# Patient Record
Sex: MALE | URBAN - METROPOLITAN AREA
[De-identification: names, ages, dates, MRNs, and addresses within clinical notes are randomized per-mention and may not be internally consistent; named-entity substitution may affect disease eponyms.]

---

## 2018-04-01 ENCOUNTER — INPATIENT (INPATIENT)
Facility: HOSPITAL | Age: 53
LOS: 3 days | Discharge: HOME HEALTH | End: 2018-04-05
Attending: INTERNAL MEDICINE | Admitting: INTERNAL MEDICINE
Payer: MEDICARE

## 2018-04-01 VITALS
HEART RATE: 49 BPM | DIASTOLIC BLOOD PRESSURE: 52 MMHG | SYSTOLIC BLOOD PRESSURE: 90 MMHG | TEMPERATURE: 98 F | OXYGEN SATURATION: 98 % | RESPIRATION RATE: 18 BRPM

## 2018-04-01 DIAGNOSIS — R55 SYNCOPE AND COLLAPSE: ICD-10-CM

## 2018-04-01 DIAGNOSIS — Z95.0 PRESENCE OF CARDIAC PACEMAKER: Chronic | ICD-10-CM

## 2018-04-01 DIAGNOSIS — Z98.89 OTHER SPECIFIED POSTPROCEDURAL STATES: Chronic | ICD-10-CM

## 2018-04-01 DIAGNOSIS — Z96.60 PRESENCE OF UNSPECIFIED ORTHOPEDIC JOINT IMPLANT: Chronic | ICD-10-CM

## 2018-04-01 DIAGNOSIS — I51.0 CARDIAC SEPTAL DEFECT, ACQUIRED: Chronic | ICD-10-CM

## 2018-04-01 LAB
ALBUMIN SERPL ELPH-MCNC: 3.8 G/DL — SIGNIFICANT CHANGE UP (ref 3.3–5)
ALP SERPL-CCNC: 96 U/L — SIGNIFICANT CHANGE UP (ref 40–120)
ALT FLD-CCNC: 14 U/L — SIGNIFICANT CHANGE UP (ref 4–41)
APTT BLD: 35.2 SEC — SIGNIFICANT CHANGE UP (ref 27.5–37.4)
AST SERPL-CCNC: 31 U/L — SIGNIFICANT CHANGE UP (ref 4–40)
BASOPHILS # BLD AUTO: 0.02 K/UL — SIGNIFICANT CHANGE UP (ref 0–0.2)
BASOPHILS NFR BLD AUTO: 0.3 % — SIGNIFICANT CHANGE UP (ref 0–2)
BILIRUB SERPL-MCNC: 2.6 MG/DL — HIGH (ref 0.2–1.2)
BUN SERPL-MCNC: 55 MG/DL — HIGH (ref 7–23)
CALCIUM SERPL-MCNC: 9.6 MG/DL — SIGNIFICANT CHANGE UP (ref 8.4–10.5)
CHLORIDE SERPL-SCNC: 88 MMOL/L — LOW (ref 98–107)
CK MB BLD-MCNC: 2.71 NG/ML — SIGNIFICANT CHANGE UP (ref 1–6.6)
CK SERPL-CCNC: 294 U/L — HIGH (ref 30–200)
CO2 SERPL-SCNC: 32 MMOL/L — HIGH (ref 22–31)
CREAT SERPL-MCNC: 1.41 MG/DL — HIGH (ref 0.5–1.3)
EOSINOPHIL # BLD AUTO: 0.01 K/UL — SIGNIFICANT CHANGE UP (ref 0–0.5)
EOSINOPHIL NFR BLD AUTO: 0.1 % — SIGNIFICANT CHANGE UP (ref 0–6)
GLUCOSE SERPL-MCNC: 127 MG/DL — HIGH (ref 70–99)
HCT VFR BLD CALC: 38.8 % — LOW (ref 39–50)
HGB BLD-MCNC: 12.3 G/DL — LOW (ref 13–17)
IMM GRANULOCYTES # BLD AUTO: 0.02 # — SIGNIFICANT CHANGE UP
IMM GRANULOCYTES NFR BLD AUTO: 0.3 % — SIGNIFICANT CHANGE UP (ref 0–1.5)
INR BLD: 1.85 — HIGH (ref 0.88–1.17)
LYMPHOCYTES # BLD AUTO: 0.87 K/UL — LOW (ref 1–3.3)
LYMPHOCYTES # BLD AUTO: 11.8 % — LOW (ref 13–44)
MCHC RBC-ENTMCNC: 29.3 PG — SIGNIFICANT CHANGE UP (ref 27–34)
MCHC RBC-ENTMCNC: 31.7 % — LOW (ref 32–36)
MCV RBC AUTO: 92.4 FL — SIGNIFICANT CHANGE UP (ref 80–100)
MONOCYTES # BLD AUTO: 0.82 K/UL — SIGNIFICANT CHANGE UP (ref 0–0.9)
MONOCYTES NFR BLD AUTO: 11.1 % — SIGNIFICANT CHANGE UP (ref 2–14)
NEUTROPHILS # BLD AUTO: 5.65 K/UL — SIGNIFICANT CHANGE UP (ref 1.8–7.4)
NEUTROPHILS NFR BLD AUTO: 76.4 % — SIGNIFICANT CHANGE UP (ref 43–77)
NRBC # FLD: 0 — SIGNIFICANT CHANGE UP
NT-PROBNP SERPL-SCNC: 4123 PG/ML — SIGNIFICANT CHANGE UP
PLATELET # BLD AUTO: 155 K/UL — SIGNIFICANT CHANGE UP (ref 150–400)
PMV BLD: 11.5 FL — SIGNIFICANT CHANGE UP (ref 7–13)
POTASSIUM SERPL-MCNC: 3 MMOL/L — LOW (ref 3.5–5.3)
POTASSIUM SERPL-SCNC: 3 MMOL/L — LOW (ref 3.5–5.3)
PROT SERPL-MCNC: 8.4 G/DL — HIGH (ref 6–8.3)
PROTHROM AB SERPL-ACNC: 21.6 SEC — HIGH (ref 9.8–13.1)
RBC # BLD: 4.2 M/UL — SIGNIFICANT CHANGE UP (ref 4.2–5.8)
RBC # FLD: 16 % — HIGH (ref 10.3–14.5)
SODIUM SERPL-SCNC: 135 MMOL/L — SIGNIFICANT CHANGE UP (ref 135–145)
TROPONIN T SERPL-MCNC: < 0.06 NG/ML — SIGNIFICANT CHANGE UP (ref 0–0.06)
WBC # BLD: 7.39 K/UL — SIGNIFICANT CHANGE UP (ref 3.8–10.5)
WBC # FLD AUTO: 7.39 K/UL — SIGNIFICANT CHANGE UP (ref 3.8–10.5)

## 2018-04-01 PROCEDURE — 71046 X-RAY EXAM CHEST 2 VIEWS: CPT | Mod: 26

## 2018-04-01 RX ORDER — POTASSIUM CHLORIDE 20 MEQ
20 PACKET (EA) ORAL ONCE
Qty: 0 | Refills: 0 | Status: COMPLETED | OUTPATIENT
Start: 2018-04-01 | End: 2018-04-01

## 2018-04-01 RX ORDER — OXYCODONE AND ACETAMINOPHEN 5; 325 MG/1; MG/1
1 TABLET ORAL ONCE
Qty: 0 | Refills: 0 | Status: DISCONTINUED | OUTPATIENT
Start: 2018-04-01 | End: 2018-04-01

## 2018-04-01 RX ORDER — ASPIRIN/CALCIUM CARB/MAGNESIUM 324 MG
162 TABLET ORAL ONCE
Qty: 0 | Refills: 0 | Status: COMPLETED | OUTPATIENT
Start: 2018-04-01 | End: 2018-04-01

## 2018-04-01 RX ORDER — POTASSIUM CHLORIDE 20 MEQ
40 PACKET (EA) ORAL ONCE
Qty: 0 | Refills: 0 | Status: COMPLETED | OUTPATIENT
Start: 2018-04-01 | End: 2018-04-01

## 2018-04-01 RX ADMIN — Medication 162 MILLIGRAM(S): at 19:25

## 2018-04-01 RX ADMIN — Medication 40 MILLIEQUIVALENT(S): at 21:12

## 2018-04-01 RX ADMIN — OXYCODONE AND ACETAMINOPHEN 1 TABLET(S): 5; 325 TABLET ORAL at 20:36

## 2018-04-01 RX ADMIN — Medication 20 MILLIEQUIVALENT(S): at 20:36

## 2018-04-01 RX ADMIN — OXYCODONE AND ACETAMINOPHEN 1 TABLET(S): 5; 325 TABLET ORAL at 19:28

## 2018-04-01 RX ADMIN — OXYCODONE AND ACETAMINOPHEN 1 TABLET(S): 5; 325 TABLET ORAL at 19:25

## 2018-04-01 NOTE — ED ADULT TRIAGE NOTE - CHIEF COMPLAINT QUOTE
p/t with hx multiple MI and  cardiac stents, c/o of weakness and dizziness since this am p/t c/o of pain  to rt le as well

## 2018-04-01 NOTE — ED PROVIDER NOTE - ATTENDING CONTRIBUTION TO CARE
53M p/w general weakness, dizziness, and leg pain x 2 days.  Constant dizziness, a/w feeling cold.  General weakness.  dizziness like he's going to pass out.  Also having some chest discomfort, intermittent.  Also with SOB, worse with exertion.  Never had it before.  Feels like he might pass out.  Poor appetite, a/w nausea.  No trauma.  c/o leg pain and back pain.  On AC - Xarelto.  Plan - multiple cardiac type sx - feeling unwell, EKG with paced rhythm and wide complex, will check labs incl CE, proBNP rx ASA and admit.   pmhx DM, HOCM, CHF, CVA  cards - [facility]  PSHX - heart surgery - septal ablation, AICD ([place])  no T  All NTG (swelling).  VS:  bradycardia, BP soft    GEN - malaise, fatigue; A+O x3.  very thin.  HEAD - NC/AT     ENT - PEERL, EOMI, mucous membranes dry , no discharge      NECK: Neck supple, non-tender without lymphadenopathy, no masses, no JVD  PULM - CTA b/l,  symmetric breath sounds.  Bilat posterior mild ribs ttp without deformity.  L ACW AICD  COR -  +murmur MG heart sounds    ABD - , ND, NT, soft, no guarding, no rebound, no masses    BACK - no CVA tenderness, nontender spine     EXTREMS - (+)1 edema, no deformity, warm and well perfused .  Bilat venous stasis changes.     SKIN - no rash or bruising      NEUROLOGIC - alert, CN 2-12 intact, sensation nl, motor 5/5 RUE/LUE/RLE/LLE.

## 2018-04-01 NOTE — ED PROVIDER NOTE - MEDICAL DECISION MAKING DETAILS
53M with 53M with presyncopal dizziness/weakness x 2 days with exacerbation of chronic RLE pain. Plan for labs, ekg, cxr, tele admission, pain control.

## 2018-04-01 NOTE — ED ADULT NURSE REASSESSMENT NOTE - NS ED NURSE REASSESS COMMENT FT1
Pt c/o pain in Right leg, with swelling x 2 days, pt walks with a cane; also c/o shortness of breath; pt has AICD defibrillator in place; paced rhythm on monitor; denies difficulty breathing at this time. Labs drawn and sent, awaiting MD orders.

## 2018-04-01 NOTE — ED PROVIDER NOTE - OBJECTIVE STATEMENT
53M p/w general weakness, dizziness, and leg pain x 2 days.  Constant dizziness, a/w feeling cold.  General weakness.  dizziness like he's going to pass out.  Also having some chest discomfort, intermittent.  Also with SOB, worse with exertion.  Never had it before.  Feels like he might pass out.  Poor appetite, a/w nausea.  No trauma.  c/o leg pain and back pain.  On AC - Xarelto Plan - multiple cardiac type sx - feeling unwell, EKG with paced rhythm and wide complex, will check labs incl CE, proBNP rx ASA and admit.   pmhx DM, HOCM, CHF, CVA  cards - [facility]  PSHX - heart surgery - septal ablation, AICD ([place])  no T  All NTG (swelling). 53M p/w general weakness, dizziness, and leg pain x 2 days.  Constant dizziness, a/w feeling cold.  General weakness.  dizziness like he's going to pass out.  Also having some chest discomfort, intermittent.  Also with SOB, worse with exertion.  Never had it before.  Feels like he might pass out.  Poor appetite, a/w nausea.  No trauma.  c/o leg pain and back pain.  On AC - Xarelto Plan - multiple cardiac type sx - feeling unwell, EKG with paced rhythm and wide complex, will check labs incl CE, proBNP rx ASA and admit.   pmhx DM, HOCM, CHF, CVA  cards - [facility]  PSHX - heart surgery - septal ablation, AICD ([place])  no T  All NTG (swelling).  VS:  bradycardia, BP soft    GEN - malaise, fatigue; A+O x3   HEAD - NC/AT     ENT - PEERL, EOMI, mucous membranes dry , no discharge      NECK: Neck supple, non-tender without lymphadenopathy, no masses, no JVD  PULM - CTA b/l,  symmetric breath sounds.  Bilat posterior mild ribs ttp without deformity  COR -  normal heart sounds    ABD - , ND, NT, soft, no guarding, no rebound, no masses    BACK - no CVA tenderness, nontender spine     EXTREMS - (+)1 edema, no deformity, warm and well perfused .  Bilat venous stasis changes.     SKIN - no rash or bruising      NEUROLOGIC - alert, CN 2-12 intact, sensation nl, motor 5/5 RUE/LUE/RLE/LLE. 53M p/w general weakness, dizziness, and leg pain x 2 days.  Constant dizziness, a/w feeling cold.  General weakness.  dizziness like he's going to pass out.  Also having some chest discomfort, intermittent.  Also with SOB, worse with exertion.  Never had it before.  Feels like he might pass out.  Poor appetite, a/w nausea.  No trauma.  c/o leg pain and back pain.  On AC - Xarelto.  Plan - multiple cardiac type sx - feeling unwell, EKG with paced rhythm and wide complex, will check labs incl CE, proBNP rx ASA and admit.   pmhx DM, HOCM, CHF, CVA  cards - [facility]  PSHX - heart surgery - septal ablation, AICD ([place])  no T  All NTG (swelling).  VS:  bradycardia, BP soft    GEN - malaise, fatigue; A+O x3.  very thin.  HEAD - NC/AT     ENT - PEERL, EOMI, mucous membranes dry , no discharge      NECK: Neck supple, non-tender without lymphadenopathy, no masses, no JVD  PULM - CTA b/l,  symmetric breath sounds.  Bilat posterior mild ribs ttp without deformity.  L ACW AICD  COR -  +murmur MG heart sounds    ABD - , ND, NT, soft, no guarding, no rebound, no masses    BACK - no CVA tenderness, nontender spine     EXTREMS - (+)1 edema, no deformity, warm and well perfused .  Bilat venous stasis changes.     SKIN - no rash or bruising      NEUROLOGIC - alert, CN 2-12 intact, sensation nl, motor 5/5 RUE/LUE/RLE/LLE.

## 2018-04-01 NOTE — ED PROVIDER NOTE - PHYSICAL EXAMINATION
msk: no midline spinal tenderness, +TTP over posterolateral R sided ribs, pelvis stable without pain, no TTP over lumbar spine, no TTP over extremities

## 2018-04-01 NOTE — ED PROVIDER NOTE - PROGRESS NOTE DETAILS
D/w Dr. Choi as admitted to his service in 2015 and pt unsure of his cardiologist's name. Requests admission under Dr. Negron. D/w Dr. Negron, accepted for admission under tele. Tele PA textpage sent.

## 2018-04-01 NOTE — ED PROVIDER NOTE - PSH
Acquired cardiac septal defect    Cardiac pacemaker  1994, [employer]  H/O bilateral hip replacements  2013  History of open heart surgery  1994- CABG  S/P hip replacement

## 2018-04-01 NOTE — ED PROVIDER NOTE - PMH
Arthritis    Atrial fibrillation    Cardiomyopathy    Chronic systolic heart failure    DM (diabetes mellitus)    DM (diabetes mellitus)    HTN (hypertension)    HTN (hypertension)    Pacemaker  with defibrillator

## 2018-04-02 DIAGNOSIS — R55 SYNCOPE AND COLLAPSE: ICD-10-CM

## 2018-04-02 DIAGNOSIS — I10 ESSENTIAL (PRIMARY) HYPERTENSION: ICD-10-CM

## 2018-04-02 DIAGNOSIS — J45.909 UNSPECIFIED ASTHMA, UNCOMPLICATED: ICD-10-CM

## 2018-04-02 DIAGNOSIS — I48.2 CHRONIC ATRIAL FIBRILLATION: ICD-10-CM

## 2018-04-02 DIAGNOSIS — M79.604 PAIN IN RIGHT LEG: ICD-10-CM

## 2018-04-02 DIAGNOSIS — E11.9 TYPE 2 DIABETES MELLITUS WITHOUT COMPLICATIONS: ICD-10-CM

## 2018-04-02 DIAGNOSIS — E87.6 HYPOKALEMIA: ICD-10-CM

## 2018-04-02 DIAGNOSIS — I42.1 OBSTRUCTIVE HYPERTROPHIC CARDIOMYOPATHY: ICD-10-CM

## 2018-04-02 DIAGNOSIS — E78.5 HYPERLIPIDEMIA, UNSPECIFIED: ICD-10-CM

## 2018-04-02 DIAGNOSIS — N17.9 ACUTE KIDNEY FAILURE, UNSPECIFIED: ICD-10-CM

## 2018-04-02 LAB
ALBUMIN SERPL ELPH-MCNC: 3.6 G/DL — SIGNIFICANT CHANGE UP (ref 3.3–5)
ALP SERPL-CCNC: 91 U/L — SIGNIFICANT CHANGE UP (ref 40–120)
ALT FLD-CCNC: 12 U/L — SIGNIFICANT CHANGE UP (ref 4–41)
AST SERPL-CCNC: 24 U/L — SIGNIFICANT CHANGE UP (ref 4–40)
B PERT DNA SPEC QL NAA+PROBE: SIGNIFICANT CHANGE UP
BILIRUB SERPL-MCNC: 2 MG/DL — HIGH (ref 0.2–1.2)
BUN SERPL-MCNC: 54 MG/DL — HIGH (ref 7–23)
BUN SERPL-MCNC: 55 MG/DL — HIGH (ref 7–23)
C PNEUM DNA SPEC QL NAA+PROBE: NOT DETECTED — SIGNIFICANT CHANGE UP
CALCIUM SERPL-MCNC: 9 MG/DL — SIGNIFICANT CHANGE UP (ref 8.4–10.5)
CALCIUM SERPL-MCNC: 9.2 MG/DL — SIGNIFICANT CHANGE UP (ref 8.4–10.5)
CHLORIDE SERPL-SCNC: 91 MMOL/L — LOW (ref 98–107)
CHLORIDE SERPL-SCNC: 91 MMOL/L — LOW (ref 98–107)
CHOLEST SERPL-MCNC: 93 MG/DL — LOW (ref 120–199)
CK MB BLD-MCNC: 2.46 NG/ML — SIGNIFICANT CHANGE UP (ref 1–6.6)
CK MB BLD-MCNC: 2.57 NG/ML — SIGNIFICANT CHANGE UP (ref 1–6.6)
CK SERPL-CCNC: 211 U/L — HIGH (ref 30–200)
CK SERPL-CCNC: 248 U/L — HIGH (ref 30–200)
CO2 SERPL-SCNC: 32 MMOL/L — HIGH (ref 22–31)
CO2 SERPL-SCNC: 36 MMOL/L — HIGH (ref 22–31)
CREAT SERPL-MCNC: 1.49 MG/DL — HIGH (ref 0.5–1.3)
CREAT SERPL-MCNC: 1.5 MG/DL — HIGH (ref 0.5–1.3)
DIGOXIN SERPL-MCNC: 1.3 NG/ML — SIGNIFICANT CHANGE UP (ref 0.8–2)
DIGOXIN SERPL-MCNC: 1.5 NG/ML — SIGNIFICANT CHANGE UP (ref 0.8–2)
FLUAV H1 2009 PAND RNA SPEC QL NAA+PROBE: NOT DETECTED — SIGNIFICANT CHANGE UP
FLUAV H1 RNA SPEC QL NAA+PROBE: NOT DETECTED — SIGNIFICANT CHANGE UP
FLUAV H3 RNA SPEC QL NAA+PROBE: NOT DETECTED — SIGNIFICANT CHANGE UP
FLUAV SUBTYP SPEC NAA+PROBE: SIGNIFICANT CHANGE UP
FLUBV RNA SPEC QL NAA+PROBE: NOT DETECTED — SIGNIFICANT CHANGE UP
GLUCOSE BLDC GLUCOMTR-MCNC: 212 MG/DL — HIGH (ref 70–99)
GLUCOSE SERPL-MCNC: 109 MG/DL — HIGH (ref 70–99)
GLUCOSE SERPL-MCNC: 110 MG/DL — HIGH (ref 70–99)
HADV DNA SPEC QL NAA+PROBE: NOT DETECTED — SIGNIFICANT CHANGE UP
HBA1C BLD-MCNC: 6.8 % — HIGH (ref 4–5.6)
HCOV 229E RNA SPEC QL NAA+PROBE: NOT DETECTED — SIGNIFICANT CHANGE UP
HCOV HKU1 RNA SPEC QL NAA+PROBE: NOT DETECTED — SIGNIFICANT CHANGE UP
HCOV NL63 RNA SPEC QL NAA+PROBE: NOT DETECTED — SIGNIFICANT CHANGE UP
HCOV OC43 RNA SPEC QL NAA+PROBE: NOT DETECTED — SIGNIFICANT CHANGE UP
HCT VFR BLD CALC: 37.6 % — LOW (ref 39–50)
HDLC SERPL-MCNC: 38 MG/DL — SIGNIFICANT CHANGE UP (ref 35–55)
HGB BLD-MCNC: 11.6 G/DL — LOW (ref 13–17)
HMPV RNA SPEC QL NAA+PROBE: NOT DETECTED — SIGNIFICANT CHANGE UP
HPIV1 RNA SPEC QL NAA+PROBE: NOT DETECTED — SIGNIFICANT CHANGE UP
HPIV2 RNA SPEC QL NAA+PROBE: NOT DETECTED — SIGNIFICANT CHANGE UP
HPIV3 RNA SPEC QL NAA+PROBE: NOT DETECTED — SIGNIFICANT CHANGE UP
HPIV4 RNA SPEC QL NAA+PROBE: NOT DETECTED — SIGNIFICANT CHANGE UP
LIPID PNL WITH DIRECT LDL SERPL: 53 MG/DL — SIGNIFICANT CHANGE UP
M PNEUMO DNA SPEC QL NAA+PROBE: NOT DETECTED — SIGNIFICANT CHANGE UP
MAGNESIUM SERPL-MCNC: 2 MG/DL — SIGNIFICANT CHANGE UP (ref 1.6–2.6)
MAGNESIUM SERPL-MCNC: 2.1 MG/DL — SIGNIFICANT CHANGE UP (ref 1.6–2.6)
MCHC RBC-ENTMCNC: 29.2 PG — SIGNIFICANT CHANGE UP (ref 27–34)
MCHC RBC-ENTMCNC: 30.9 % — LOW (ref 32–36)
MCV RBC AUTO: 94.7 FL — SIGNIFICANT CHANGE UP (ref 80–100)
NRBC # FLD: 0 — SIGNIFICANT CHANGE UP
PLATELET # BLD AUTO: 138 K/UL — LOW (ref 150–400)
PMV BLD: 11.2 FL — SIGNIFICANT CHANGE UP (ref 7–13)
POTASSIUM SERPL-MCNC: 3 MMOL/L — LOW (ref 3.5–5.3)
POTASSIUM SERPL-MCNC: 3.5 MMOL/L — SIGNIFICANT CHANGE UP (ref 3.5–5.3)
POTASSIUM SERPL-SCNC: 3 MMOL/L — LOW (ref 3.5–5.3)
POTASSIUM SERPL-SCNC: 3.5 MMOL/L — SIGNIFICANT CHANGE UP (ref 3.5–5.3)
PROT SERPL-MCNC: 7.6 G/DL — SIGNIFICANT CHANGE UP (ref 6–8.3)
RBC # BLD: 3.97 M/UL — LOW (ref 4.2–5.8)
RBC # FLD: 16.2 % — HIGH (ref 10.3–14.5)
RSV RNA SPEC QL NAA+PROBE: NOT DETECTED — SIGNIFICANT CHANGE UP
RV+EV RNA SPEC QL NAA+PROBE: NOT DETECTED — SIGNIFICANT CHANGE UP
SODIUM SERPL-SCNC: 137 MMOL/L — SIGNIFICANT CHANGE UP (ref 135–145)
SODIUM SERPL-SCNC: 138 MMOL/L — SIGNIFICANT CHANGE UP (ref 135–145)
TRIGL SERPL-MCNC: 71 MG/DL — SIGNIFICANT CHANGE UP (ref 10–149)
TROPONIN T SERPL-MCNC: < 0.06 NG/ML — SIGNIFICANT CHANGE UP (ref 0–0.06)
TROPONIN T SERPL-MCNC: < 0.06 NG/ML — SIGNIFICANT CHANGE UP (ref 0–0.06)
WBC # BLD: 6.72 K/UL — SIGNIFICANT CHANGE UP (ref 3.8–10.5)
WBC # FLD AUTO: 6.72 K/UL — SIGNIFICANT CHANGE UP (ref 3.8–10.5)

## 2018-04-02 PROCEDURE — 93282 PRGRMG EVAL IMPLANTABLE DFB: CPT | Mod: 26

## 2018-04-02 PROCEDURE — 76775 US EXAM ABDO BACK WALL LIM: CPT | Mod: 26

## 2018-04-02 RX ORDER — COLCHICINE 0.6 MG
0.6 TABLET ORAL DAILY
Qty: 0 | Refills: 0 | Status: DISCONTINUED | OUTPATIENT
Start: 2018-04-02 | End: 2018-04-05

## 2018-04-02 RX ORDER — MORPHINE SULFATE 50 MG/1
1 CAPSULE, EXTENDED RELEASE ORAL ONCE
Qty: 0 | Refills: 0 | Status: DISCONTINUED | OUTPATIENT
Start: 2018-04-02 | End: 2018-04-02

## 2018-04-02 RX ORDER — FUROSEMIDE 40 MG
40 TABLET ORAL
Qty: 0 | Refills: 0 | Status: DISCONTINUED | OUTPATIENT
Start: 2018-04-02 | End: 2018-04-02

## 2018-04-02 RX ORDER — DEXTROSE 50 % IN WATER 50 %
25 SYRINGE (ML) INTRAVENOUS ONCE
Qty: 0 | Refills: 0 | Status: DISCONTINUED | OUTPATIENT
Start: 2018-04-02 | End: 2018-04-05

## 2018-04-02 RX ORDER — SODIUM CHLORIDE 9 MG/ML
1000 INJECTION, SOLUTION INTRAVENOUS
Qty: 0 | Refills: 0 | Status: DISCONTINUED | OUTPATIENT
Start: 2018-04-02 | End: 2018-04-05

## 2018-04-02 RX ORDER — MONTELUKAST 4 MG/1
1 TABLET, CHEWABLE ORAL
Qty: 0 | Refills: 0 | COMMUNITY

## 2018-04-02 RX ORDER — OXYCODONE AND ACETAMINOPHEN 5; 325 MG/1; MG/1
2 TABLET ORAL EVERY 6 HOURS
Qty: 0 | Refills: 0 | Status: DISCONTINUED | OUTPATIENT
Start: 2018-04-02 | End: 2018-04-05

## 2018-04-02 RX ORDER — ALBUTEROL 90 UG/1
2 AEROSOL, METERED ORAL EVERY 6 HOURS
Qty: 0 | Refills: 0 | Status: DISCONTINUED | OUTPATIENT
Start: 2018-04-02 | End: 2018-04-05

## 2018-04-02 RX ORDER — POTASSIUM CHLORIDE 20 MEQ
40 PACKET (EA) ORAL EVERY 4 HOURS
Qty: 0 | Refills: 0 | Status: COMPLETED | OUTPATIENT
Start: 2018-04-02 | End: 2018-04-02

## 2018-04-02 RX ORDER — ALBUTEROL 90 UG/1
2 AEROSOL, METERED ORAL
Qty: 0 | Refills: 0 | COMMUNITY

## 2018-04-02 RX ORDER — DIGOXIN 250 MCG
0.12 TABLET ORAL DAILY
Qty: 0 | Refills: 0 | Status: DISCONTINUED | OUTPATIENT
Start: 2018-04-02 | End: 2018-04-02

## 2018-04-02 RX ORDER — DEXTROSE 50 % IN WATER 50 %
12.5 SYRINGE (ML) INTRAVENOUS ONCE
Qty: 0 | Refills: 0 | Status: DISCONTINUED | OUTPATIENT
Start: 2018-04-02 | End: 2018-04-05

## 2018-04-02 RX ORDER — MONTELUKAST 4 MG/1
10 TABLET, CHEWABLE ORAL DAILY
Qty: 0 | Refills: 0 | Status: DISCONTINUED | OUTPATIENT
Start: 2018-04-02 | End: 2018-04-05

## 2018-04-02 RX ORDER — FLUTICASONE PROPIONATE AND SALMETEROL 50; 250 UG/1; UG/1
1 POWDER ORAL; RESPIRATORY (INHALATION)
Qty: 0 | Refills: 0 | COMMUNITY

## 2018-04-02 RX ORDER — SODIUM CHLORIDE 9 MG/ML
250 INJECTION INTRAMUSCULAR; INTRAVENOUS; SUBCUTANEOUS ONCE
Qty: 0 | Refills: 0 | Status: COMPLETED | OUTPATIENT
Start: 2018-04-02 | End: 2018-04-02

## 2018-04-02 RX ORDER — INSULIN LISPRO 100/ML
VIAL (ML) SUBCUTANEOUS
Qty: 0 | Refills: 0 | Status: DISCONTINUED | OUTPATIENT
Start: 2018-04-02 | End: 2018-04-05

## 2018-04-02 RX ORDER — GLUCAGON INJECTION, SOLUTION 0.5 MG/.1ML
1 INJECTION, SOLUTION SUBCUTANEOUS ONCE
Qty: 0 | Refills: 0 | Status: DISCONTINUED | OUTPATIENT
Start: 2018-04-02 | End: 2018-04-05

## 2018-04-02 RX ORDER — BUDESONIDE AND FORMOTEROL FUMARATE DIHYDRATE 160; 4.5 UG/1; UG/1
2 AEROSOL RESPIRATORY (INHALATION)
Qty: 0 | Refills: 0 | Status: DISCONTINUED | OUTPATIENT
Start: 2018-04-02 | End: 2018-04-05

## 2018-04-02 RX ORDER — APIXABAN 2.5 MG/1
5 TABLET, FILM COATED ORAL EVERY 12 HOURS
Qty: 0 | Refills: 0 | Status: DISCONTINUED | OUTPATIENT
Start: 2018-04-02 | End: 2018-04-05

## 2018-04-02 RX ORDER — ATORVASTATIN CALCIUM 80 MG/1
10 TABLET, FILM COATED ORAL AT BEDTIME
Qty: 0 | Refills: 0 | Status: DISCONTINUED | OUTPATIENT
Start: 2018-04-02 | End: 2018-04-05

## 2018-04-02 RX ORDER — METOPROLOL TARTRATE 50 MG
1 TABLET ORAL
Qty: 0 | Refills: 0 | COMMUNITY

## 2018-04-02 RX ORDER — METOPROLOL TARTRATE 50 MG
50 TABLET ORAL EVERY 12 HOURS
Qty: 0 | Refills: 0 | Status: DISCONTINUED | OUTPATIENT
Start: 2018-04-02 | End: 2018-04-05

## 2018-04-02 RX ORDER — INSULIN LISPRO 100/ML
VIAL (ML) SUBCUTANEOUS AT BEDTIME
Qty: 0 | Refills: 0 | Status: DISCONTINUED | OUTPATIENT
Start: 2018-04-02 | End: 2018-04-05

## 2018-04-02 RX ORDER — IPRATROPIUM/ALBUTEROL SULFATE 18-103MCG
3 AEROSOL WITH ADAPTER (GRAM) INHALATION ONCE
Qty: 0 | Refills: 0 | Status: COMPLETED | OUTPATIENT
Start: 2018-04-02 | End: 2018-04-02

## 2018-04-02 RX ORDER — DEXTROSE 50 % IN WATER 50 %
1 SYRINGE (ML) INTRAVENOUS ONCE
Qty: 0 | Refills: 0 | Status: DISCONTINUED | OUTPATIENT
Start: 2018-04-02 | End: 2018-04-05

## 2018-04-02 RX ORDER — COLCHICINE 0.6 MG
1 TABLET ORAL
Qty: 0 | Refills: 0 | COMMUNITY

## 2018-04-02 RX ADMIN — BUDESONIDE AND FORMOTEROL FUMARATE DIHYDRATE 2 PUFF(S): 160; 4.5 AEROSOL RESPIRATORY (INHALATION) at 09:18

## 2018-04-02 RX ADMIN — OXYCODONE AND ACETAMINOPHEN 2 TABLET(S): 5; 325 TABLET ORAL at 18:30

## 2018-04-02 RX ADMIN — Medication 40 MILLIEQUIVALENT(S): at 04:16

## 2018-04-02 RX ADMIN — Medication 0.6 MILLIGRAM(S): at 14:43

## 2018-04-02 RX ADMIN — MORPHINE SULFATE 1 MILLIGRAM(S): 50 CAPSULE, EXTENDED RELEASE ORAL at 00:52

## 2018-04-02 RX ADMIN — MORPHINE SULFATE 1 MILLIGRAM(S): 50 CAPSULE, EXTENDED RELEASE ORAL at 01:55

## 2018-04-02 RX ADMIN — APIXABAN 5 MILLIGRAM(S): 2.5 TABLET, FILM COATED ORAL at 17:35

## 2018-04-02 RX ADMIN — Medication 40 MILLIEQUIVALENT(S): at 10:07

## 2018-04-02 RX ADMIN — OXYCODONE AND ACETAMINOPHEN 2 TABLET(S): 5; 325 TABLET ORAL at 23:41

## 2018-04-02 RX ADMIN — OXYCODONE AND ACETAMINOPHEN 2 TABLET(S): 5; 325 TABLET ORAL at 11:00

## 2018-04-02 RX ADMIN — SODIUM CHLORIDE 500 MILLILITER(S): 9 INJECTION INTRAMUSCULAR; INTRAVENOUS; SUBCUTANEOUS at 06:12

## 2018-04-02 RX ADMIN — BUDESONIDE AND FORMOTEROL FUMARATE DIHYDRATE 2 PUFF(S): 160; 4.5 AEROSOL RESPIRATORY (INHALATION) at 22:34

## 2018-04-02 RX ADMIN — Medication 40 MILLIEQUIVALENT(S): at 14:43

## 2018-04-02 RX ADMIN — MONTELUKAST 10 MILLIGRAM(S): 4 TABLET, CHEWABLE ORAL at 14:43

## 2018-04-02 RX ADMIN — OXYCODONE AND ACETAMINOPHEN 2 TABLET(S): 5; 325 TABLET ORAL at 17:35

## 2018-04-02 RX ADMIN — SODIUM CHLORIDE 500 MILLILITER(S): 9 INJECTION INTRAMUSCULAR; INTRAVENOUS; SUBCUTANEOUS at 03:48

## 2018-04-02 RX ADMIN — ATORVASTATIN CALCIUM 10 MILLIGRAM(S): 80 TABLET, FILM COATED ORAL at 22:33

## 2018-04-02 RX ADMIN — OXYCODONE AND ACETAMINOPHEN 1 TABLET(S): 5; 325 TABLET ORAL at 00:43

## 2018-04-02 RX ADMIN — OXYCODONE AND ACETAMINOPHEN 2 TABLET(S): 5; 325 TABLET ORAL at 10:07

## 2018-04-02 RX ADMIN — APIXABAN 5 MILLIGRAM(S): 2.5 TABLET, FILM COATED ORAL at 07:10

## 2018-04-02 RX ADMIN — Medication 3 MILLILITER(S): at 00:52

## 2018-04-02 NOTE — H&P ADULT - PMH
Arthritis    Asthma    Atrial fibrillation    Cardiomyopathy    Chronic pain of both lower extremities    Chronic systolic heart failure    DM (diabetes mellitus)    Gout    HTN (hypertension)    Hyperlipidemia    Pacemaker  with defibrillator

## 2018-04-02 NOTE — CHART NOTE - NSCHARTNOTEFT_GEN_A_CORE
ELECTROPHYSIOLOGY    Device Interrogation Performed                                  Date/Time:     04/02/2018    @ 11:10am  :         [employer]                               Model:      Dynagen single chamber ICD                           Mode:              VVIR                                         Rate:  50/130     Atrial Lead:  P wave amplitude:                          mv            Impedance                                       Ohms  Threshold                                          V @                ms      Ventricular Lead: RV  R wave amplitude      5.0                     mv  Impedance               403                        Ohms  Threshold                 0.7                        V @       0.5          ms        Battery Status:                     Good                Underlying Rhythm:             Atrial fibrillation with slow ventricular response    Events/Observation:            Brief episodes of NSVT on 2/20 and 2.22. No therapies.  No events corresponding to his admission symptoms of dizziness and chest pain which                                          occur every day.                                           Review of histograms indicate good chronotropic response (LRL at 50 b/min with heart rate typically  b/min).  Ventricular pacing 14%.    Impression/Plan:  Normal ICD function.   Normal sensing and pacing via iterative testing. Good battery status. Excellent threshold capture.  No reprogramming.

## 2018-04-02 NOTE — H&P ADULT - NSHPPHYSICALEXAM_GEN_ALL_CORE
--Vital Signs Last 24 Hrs  T(C): 36.1 (02 Apr 2018 00:52), Max: 36.8 (01 Apr 2018 20:34)  T(F): 97 (02 Apr 2018 00:52), Max: 98.2 (01 Apr 2018 20:34)  HR: 50 (02 Apr 2018 00:52) (49 - 50)  BP: 102/60 (02 Apr 2018 00:52) (90/52 - 111/56)  RR: 18 (02 Apr 2018 00:52) (18 - 18)  SpO2: 100% (02 Apr 2018 00:52) (98% - 100%)    PHYSICAL EXAM:  GENERAL: NAD, well-groomed, well-developed  HEENT: NC/AT. Poor dentition. EOMI and PERRL, bilat.   NECK: Supple, No JVD.   NERVOUS SYSTEM:  Alert & Oriented X3, Good concentration; Motor Strength 5/5 B/L upper and lower extremities; DTRs 2+ intact and symmetric  CHEST/LUNG: Fair air entry. +Wheezing. No rhonchi or rales.   HEART: Regular rate and rhythm; Grade 4 murmur. No gallops or rubs.   ABDOMEN: Soft, Nontender, Nondistended; Bowel sounds present  EXTREMITIES:  2+ Peripheral Pulses, Mild bilat ankle edema. Chronic hyperpigmentation of bilat LEs.

## 2018-04-02 NOTE — CONSULT NOTE ADULT - SUBJECTIVE AND OBJECTIVE BOX
CHIEF COMPLAINT:Patient is a 53y old  Male who presents with a chief complaint of Near syncope (02 Apr 2018 00:40)      HISTORY OF PRESENT ILLNESS:  This is a pleasant gentleman with history as below presented with fatigue , weakness and near syncope   He has history of HOCM, CHF (EF 53%) s/p AICD, HTN, HLD, DM-II (diet controlled), hx CVA, CAD s/p CABG 1994, Asthma, Gout, chronic LE pain, Afib on Eliquis with c/o generalized weakness, intermittent episodes of chest tightness (non-radiating), poor appetite, and dizziness on standing with near syncope x 2 days with associated nausea. Pt reports baseline orthopnea (uses 6 pillows at night).     PAST MEDICAL & SURGICAL HISTORY:  Hyperlipidemia  Gout  Asthma  Chronic pain of both lower extremities  Atrial fibrillation  Arthritis  Chronic systolic heart failure  Pacemaker: with defibrillator  DM (diabetes mellitus)  HTN (hypertension)  Cardiomyopathy  Cardiac pacemaker: 1994, [employer]  H/O bilateral hip replacements: 2013  History of open heart surgery: 1994- CABG  S/P hip replacement  Acquired cardiac septal defect          MEDICATIONS:  apixaban 5 milliGRAM(s) Oral every 12 hours  digoxin     Tablet 0.125 milliGRAM(s) Oral daily  furosemide    Tablet 40 milliGRAM(s) Oral two times a day  metolazone 2.5 milliGRAM(s) Oral daily  metoprolol tartrate 50 milliGRAM(s) Oral every 12 hours      ALBUTerol    90 MICROgram(s) HFA Inhaler 2 Puff(s) Inhalation every 6 hours PRN  buDESOnide 160 MICROgram(s)/formoterol 4.5 MICROgram(s) Inhaler 2 Puff(s) Inhalation two times a day  montelukast 10 milliGRAM(s) Oral daily    oxyCODONE    5 mG/acetaminophen 325 mG 2 Tablet(s) Oral every 6 hours PRN      atorvastatin 10 milliGRAM(s) Oral at bedtime  colchicine 0.6 milliGRAM(s) Oral daily    potassium chloride    Tablet ER 40 milliEquivalent(s) Oral every 4 hours      FAMILY HISTORY:  Family history of coronary artery disease in father (Father)  Family history of hypertension in father (Father)  Family history of hypertension in mother (Mother)      Non-contributory    SOCIAL HISTORY:    No tobacco, drugs or etoh    Allergies    nitroglycerin (Angioedema)    Intolerances    	    REVIEW OF SYSTEMS:  as above  The rest of the 14 points ROS reviewed and except above they are unremarkable.        PHYSICAL EXAM:  T(C): 36.9 (04-02-18 @ 06:07), Max: 36.9 (04-02-18 @ 06:07)  HR: 50 (04-02-18 @ 06:07) (49 - 74)  BP: 91/46 (04-02-18 @ 06:07) (84/49 - 111/56)  RR: 15 (04-02-18 @ 06:07) (15 - 18)  SpO2: 100% (04-02-18 @ 06:07) (98% - 100%)  Wt(kg): --  I&O's Summary    JVP: Normal  Neck: supple  Lung: clear   CV: S1 S2 , Murmur: 3/6 michi at apex   Abd: soft  Ext: No edema  neuro: Awake / alert  Psych: flat affect  Skin: normal     LABS/DATA:    TELEMETRY: 	    ECG:  	paced    	  CARDIAC MARKERS:  Troponin T, Serum: < 0.06 ng/mL (04-02 @ 02:15)  Troponin T, Serum: < 0.06 ng/mL (04-01 @ 19:09)      CKMB: 2.46 ng/mL (04-02 @ 02:15)  CKMB: 2.71 ng/mL (04-01 @ 19:09)                              12.3   7.39  )-----------( 155      ( 01 Apr 2018 19:09 )             38.8     04-02    138  |  91<L>  |  54<H>  ----------------------------<  109<H>  3.0<L>   |  36<H>  |  1.49<H>    Ca    9.0      02 Apr 2018 02:15  Mg     2.0     04-02    TPro  7.6  /  Alb  3.6  /  TBili  2.0<H>  /  DBili  x   /  AST  24  /  ALT  12  /  AlkPhos  91  04-02    proBNP: Serum Pro-Brain Natriuretic Peptide: 4123 pg/mL (04-01 @ 19:09)    Lipid Profile:   HgA1c:   TSH:

## 2018-04-02 NOTE — H&P ADULT - PROBLEM SELECTOR PLAN 8
Pt does not recall home dose of Lipitor  Will continue at lowest dose until home dose verified  Obtain lipid profile

## 2018-04-02 NOTE — CONSULT NOTE ADULT - SUBJECTIVE AND OBJECTIVE BOX
HPI: Mr. Juan is a 53 year-old man with history of multiple medical issues including hypertension, type 2 diabetes mellitus, hypertrophic obstructive cardiomyopathy, coronary artery disease s/p CABG 1994, asthma, gout, and atrial fibrillation. He presented overnight to the [facility] ER with generalized weakness, intermittent chest tightness, loss of appetite, nausea, and dizziness for 2 days.     Mr. Juan was noted to be hypokalemic in the ER and was given 60meq of PO KCl. He also received oral Percocet for chronic leg pain.    Mr. Juan takes lasix 80mg po bid at home, as well as metolazone 2.5mg po qd.    PAST MEDICAL & SURGICAL HISTORY:  Hyperlipidemia  Gout  Asthma  Chronic pain of both lower extremities  Atrial fibrillation  Arthritis  Chronic systolic heart failure  Pacemaker: with defibrillator  DM (diabetes mellitus)  HTN (hypertension)  Cardiomyopathy  Cardiac pacemaker: 1994, [employer]  H/O bilateral hip replacements: 2013  History of open heart surgery: 1994- CABG  S/P hip replacement  Acquired cardiac septal defect    MEDICATIONS  (STANDING):  apixaban 5 milliGRAM(s) Oral every 12 hours  atorvastatin 10 milliGRAM(s) Oral at bedtime  buDESOnide 160 MICROgram(s)/formoterol 4.5 MICROgram(s) Inhaler 2 Puff(s) Inhalation two times a day  colchicine 0.6 milliGRAM(s) Oral daily  furosemide    Tablet 40 milliGRAM(s) Oral two times a day  metoprolol tartrate 50 milliGRAM(s) Oral every 12 hours  montelukast 10 milliGRAM(s) Oral daily  potassium chloride    Tablet ER 40 milliEquivalent(s) Oral every 4 hours    Allergies  nitroglycerin (Angioedema)    SOCIAL HISTORY:  Denies ETOh,Smoking,     FAMILY HISTORY:  Family history of coronary artery disease in father (Father)  Family history of hypertension in father (Father)  Family history of hypertension in mother (Mother)    REVIEW OF SYSTEMS:  CONSTITUTIONAL: (+)weakness, no fevers or chills; (+)loss of appetite  EYES/ENT: No visual changes;  No vertigo or throat pain   NECK: No pain or stiffness  RESPIRATORY: No cough, wheezing, hemoptysis; No shortness of breath  CARDIOVASCULAR: (+)chest tightness; no palpitations; (+)dizziness  GASTROINTESTINAL: No abdominal or epigastric pain. (+)nausea; no, vomiting, or hematemesis; No diarrhea or constipation. No melena or hematochezia.  GENITOURINARY: No dysuria, frequency or hematuria  NEUROLOGICAL: No numbness or weakness; (+)leg pain  SKIN: No itching, burning, rashes, or lesions   All other review of systems is negative unless indicated above.    VITAL:  T(C): , Max: 36.9 (04-02-18 @ 06:07)  T(F): , Max: 98.4 (04-02-18 @ 06:07)  HR: 50 (04-02-18 @ 06:07)  BP: 91/46 (04-02-18 @ 06:07)  BP(mean): 68 (04-02-18 @ 04:11)  RR: 15 (04-02-18 @ 06:07)  SpO2: 100% (04-02-18 @ 06:07)    PHYSICAL EXAM:  Constitutional: NAD, Alert  HEENT: NCAT, MMM  Neck: Supple, No JVD  Respiratory: CTA-b/l  Cardiovascular: RRR s1s2, no m/r/g  Gastrointestinal: BS+, soft, NT/ND  Extremities: No peripheral edema b/l  Neurological: no focal deficits; strength grossly intact  Psychiatric: Normal mood, normal affect  Back: no CVAT b/l  Skin: No rashes, no nevi    LABS:                        12.3   7.39  )-----------( 155      ( 01 Apr 2018 19:09 )             38.8     Na(138)/K(3.0)/Cl(91)/HCO3(36)/BUN(54)/Cr(1.49)Glu(109)/Ca(9.0)/Mg(2.0)/PO4(--)    04-02 @ 02:15  Na(135)/K(3.0)/Cl(88)/HCO3(32)/BUN(55)/Cr(1.41)Glu(127)/Ca(9.6)/Mg(--)/PO4(--)    04-01 @ 19:09    (9/2016)- BUN/creatinine 18/0.88  (11/2015) - Urinalysis - no proteinuria; 0-2RBC, 0-2WBC      IMAGING:  < from: Xray Chest 2 Views PA/Lat (04.01.18 @ 20:04) >  Clear lungs.    ASSESSMENT:  (1)Renal - Unclear baseline creatinine - presumed component of FANY - prerenal?  (2)Hypokalemia - whole-body deficit due to diuretics  (3)Metablic alkalosis - likely due to contraction from diuretics.        RECOMMEND:  (1)No diuretics for now  (2)Trend orthostatics  (3)K+ repletion daily based on levels  (4)Urine: lytes, creatinine, urinalysis  (5)Renal ultrasound  (6)Will attempt to obtain baseline GFR from outpatient setting  (7)BMP daily  (8)Dose new meds for GFR 30-40ml/min      Thank you for involving [facility] in this patient's care.    With warm regards,    Gigi Huertas MD   [facility]  [phone] HPI: Mr. Juan is a 53 year-old man with history of multiple medical issues including hypertension, type 2 diabetes mellitus, hypertrophic obstructive cardiomyopathy, coronary artery disease s/p CABG 1994, asthma, gout, and atrial fibrillation. He presented overnight to the [facility] ER with generalized weakness, intermittent chest tightness, loss of appetite, nausea, and dizziness for 2 days.     Mr. Juan was noted to be hypokalemic in the ER and was given 60meq of PO KCl. He also received oral Percocet for chronic leg pain.    Mr. Juan takes lasix 80mg po bid at home, as well as metolazone 2.5mg po qd.    Mr. Juan tells me that he does not closely follow with outside physcians, besides his electrophysiologist. He has not had bloodwork performed in the recent past. He attests to chronic right-sided back pain. He states that he has been told that the pain is emanating from his kidneys. He denies having passed stones/denies known history of stones. He denies gross hematuria. He states that he took NSAIDs in the past, but has not taken them of late, as they are not effective in relieving his pain.      PAST MEDICAL & SURGICAL HISTORY:  Hyperlipidemia  Gout  Asthma  Chronic pain of both lower extremities  Atrial fibrillation  Arthritis  Chronic systolic heart failure  Pacemaker: with defibrillator  DM (diabetes mellitus)  HTN (hypertension)  Cardiomyopathy  Cardiac pacemaker: 1994, [employer]  H/O bilateral hip replacements: 2013  History of open heart surgery: 1994- CABG  S/P hip replacement  Acquired cardiac septal defect    MEDICATIONS  (STANDING):  apixaban 5 milliGRAM(s) Oral every 12 hours  atorvastatin 10 milliGRAM(s) Oral at bedtime  buDESOnide 160 MICROgram(s)/formoterol 4.5 MICROgram(s) Inhaler 2 Puff(s) Inhalation two times a day  colchicine 0.6 milliGRAM(s) Oral daily  furosemide    Tablet 40 milliGRAM(s) Oral two times a day  metoprolol tartrate 50 milliGRAM(s) Oral every 12 hours  montelukast 10 milliGRAM(s) Oral daily  potassium chloride    Tablet ER 40 milliEquivalent(s) Oral every 4 hours    Allergies  nitroglycerin (Angioedema)    SOCIAL HISTORY:  Denies ETOh,Smoking,     FAMILY HISTORY:  Family history of coronary artery disease in father (Father)  Family history of hypertension in father (Father)  Family history of hypertension in mother (Mother)    REVIEW OF SYSTEMS:  CONSTITUTIONAL: (+)weakness, no fevers or chills; (+)loss of appetite  EYES/ENT: No visual changes;  No vertigo or throat pain   NECK: No pain or stiffness  RESPIRATORY: No cough, wheezing, hemoptysis; No shortness of breath  CARDIOVASCULAR: (+)chest tightness; no palpitations; (+)dizziness  GASTROINTESTINAL: No abdominal or epigastric pain. (+)nausea; no, vomiting, or hematemesis; No diarrhea or constipation. No melena or hematochezia.  GENITOURINARY: No dysuria, frequency or hematuria  NEUROLOGICAL: No numbness or weakness; (+)leg pain; (+)back pain  SKIN: No itching, burning, rashes, or lesions   All other review of systems is negative unless indicated above.    VITAL:  T(C): , Max: 36.9 (04-02-18 @ 06:07)  T(F): , Max: 98.4 (04-02-18 @ 06:07)  HR: 50 (04-02-18 @ 06:07)  BP: 91/46 (04-02-18 @ 06:07)  BP(mean): 68 (04-02-18 @ 04:11)  RR: 15 (04-02-18 @ 06:07)  SpO2: 100% (04-02-18 @ 06:07)    PHYSICAL EXAM:  Constitutional: NAD, Alert; short  HEENT: NCAT, DMM  Neck: Supple, No JVD  Respiratory: CTA-b/l  Cardiovascular: RRR s1s2, 2/6 MG  Gastrointestinal: BS+, soft, NT/ND  Extremities: 1+ swelling b/l LE edema  Neurological: no focal deficits; strength grossly intact  Psychiatric: Normal mood, normal affect  Back: no CVAT b/l  Skin: (+)midsternal wound - clean-appearing; diffuse papular rash    LABS:                        12.3   7.39  )-----------( 155      ( 01 Apr 2018 19:09 )             38.8     Na(138)/K(3.0)/Cl(91)/HCO3(36)/BUN(54)/Cr(1.49)Glu(109)/Ca(9.0)/Mg(2.0)/PO4(--)    04-02 @ 02:15  Na(135)/K(3.0)/Cl(88)/HCO3(32)/BUN(55)/Cr(1.41)Glu(127)/Ca(9.6)/Mg(--)/PO4(--)    04-01 @ 19:09    (9/2016)- BUN/creatinine 18/0.88  (11/2015) - Urinalysis - no proteinuria; 0-2RBC, 0-2WBC      IMAGING:  < from: Xray Chest 2 Views PA/Lat (04.01.18 @ 20:04) >  Clear lungs.    ASSESSMENT:  (1)Renal - Unclear baseline creatinine - presumed component of FANY - prerenal?  (2)Hypokalemia - whole-body deficit due to diuretics  (3)Metablic alkalosis - likely due to contraction from diuretics.    RECOMMEND:  (1)No diuretics for now  (2)Trend orthostatics  (3)K+ repletion daily based on levels  (4)Urine: lytes, creatinine, urinalysis  (5)Renal ultrasound  (6)Will attempt to obtain baseline GFR from outpatient setting  (7)BMP daily  (8)Dose new meds for GFR 30-40ml/min    Thank you for involving [facility] in this patient's care.    With warm regards,    Gigi Huertas MD   [facility]  [phone]

## 2018-04-02 NOTE — H&P ADULT - NSHPLABSRESULTS_GEN_ALL_CORE
LABS:                        12.3   7.39  )-----------( 155      ( 01 Apr 2018 19:09 )             38.8     04-01    135  |  88<L>  |  55<H>  ----------------------------<  127<H>  3.0<L>   |  32<H>  |  1.41<H>    Ca    9.6      01 Apr 2018 19:09  TPro  8.4<H>  /  Alb  3.8  /  TBili  2.6<H>  /  DBili  x   /  AST  31  /  ALT  14  /  AlkPhos  96  04-01    PT/INR - ( 01 Apr 2018 19:09 )   PT: 21.6 SEC;   INR: 1.85     PTT - ( 01 Apr 2018 19:09 )  PTT:35.2 SEC    CARDIAC MARKERS ( 01 Apr 2018 19:09 )  x     / < 0.06 ng/mL / 294 u/L / 2.71 ng/mL / x        EKG: V paced. Rate: 50 bpm. QTc: 537ms. LABS:                        12.3   7.39  )-----------( 155      ( 01 Apr 2018 19:09 )             38.8     04-01    135  |  88<L>  |  55<H>  ----------------------------<  127<H>  3.0<L>   |  32<H>  |  1.41<H>    Ca    9.6      01 Apr 2018 19:09  TPro  8.4<H>  /  Alb  3.8  /  TBili  2.6<H>  /  DBili  x   /  AST  31  /  ALT  14  /  AlkPhos  96  04-01    PT/INR - ( 01 Apr 2018 19:09 )   PT: 21.6 SEC;   INR: 1.85     PTT - ( 01 Apr 2018 19:09 )  PTT:35.2 SEC    CARDIAC MARKERS ( 01 Apr 2018 19:09 )  x     / < 0.06 ng/mL / 294 u/L / 2.71 ng/mL / x        EKG: V paced. Rate: 50 bpm. QTc: 537ms.    < from: Xray Chest 2 Views PA/Lat (04.01.18 @ 20:04) >  EXAM:  XR CHEST PA LAT 2V    PROCEDURE DATE:  Apr 1 2018   ******PRELIMINARY REPORT******    ******PRELIMINARY REPORT******        INTERPRETATION:  No acute or emergent findings. cardiomegaly. follow up official report.

## 2018-04-02 NOTE — H&P ADULT - PROBLEM SELECTOR PLAN 4
Likely secondary to poor PO intake  Baseline creatinine in 2016 ~0.9  Will decrease home dose of lasix from 80mg bid to 40mg bid  Avoid nephrotoxic meds  Renally adjust meds  Monitor Is and Os

## 2018-04-02 NOTE — H&P ADULT - NSHPSOCIALHISTORY_GEN_ALL_CORE
Lives with wife. Has HHA 7 days a week, 8 hours a day. Denies any hx of EtOH, tobacco, or illicit drug use.

## 2018-04-02 NOTE — H&P ADULT - NSHPREVIEWOFSYSTEMS_GEN_ALL_CORE
REVIEW OF SYSTEMS:  CONSTITUTIONAL: +Generalized weakness. No fever, weight loss, or fatigue.  EYES: No eye pain, visual disturbances, or discharge  ENMT:  No difficulty hearing, tinnitus, vertigo; No sinus or throat pain  NECK: No pain or stiffness  RESPIRATORY: No cough, wheezing, chills or hemoptysis; No shortness of breath  CARDIOVASCULAR: +Intermittent chest tightness. +Near syncope. +Postural dizziness. +Chronic LE edema. No palpitations.  GASTROINTESTINAL: +Nausea. No abdominal or epigastric pain. No vomiting or hematemesis; No diarrhea or constipation. No melena or hematochezia.  GENITOURINARY: No dysuria, frequency, hematuria, or incontinence  NEUROLOGICAL: No headaches, memory loss, loss of strength, numbness, or tremors  SKIN: No itching, burning, rashes, or lesions   LYMPH NODES: No enlarged glands  ENDOCRINE: No heat or cold intolerance; No hair loss  MUSCULOSKELETAL: +Chronic LE pain. +Bilat ankle swelling, chronic. No joint pain  PSYCHIATRIC: No depression, anxiety, mood swings, or difficulty sleeping  HEME/LYMPH: No easy bruising, or bleeding gums  ALLERGY AND IMMUNOLOGIC: No hives or eczema

## 2018-04-02 NOTE — H&P ADULT - PROBLEM SELECTOR PLAN 1
Admit to telemetry for cardiac monitoring  Serial cardiac enzymes and EKG  Obtain orthostatics  Obtain TTE  Fall precautions  EP c/s for device interrogation  Consider CT Head Admit to telemetry for cardiac monitoring  Serial cardiac enzymes and EKG  Obtain orthostatics  Obtain TTE  Fall precautions  EP c/s for device interrogation

## 2018-04-02 NOTE — H&P ADULT - ASSESSMENT
52 y/o male with a hx of HOCM, CHF (EF 53%) s/p AICD, HTN, HLD, DM-II (diet controlled), hx CVA, CAD s/p CABG 1994, Asthma, Gout, chronic LE pain, Afib on Eliquis with c/o generalized weakness, intermittent episodes of chest tightness (non-radiating), poor appetite, and dizziness on standing with near syncope x 2 days with associated nausea.

## 2018-04-02 NOTE — CONSULT NOTE ADULT - ASSESSMENT
Near Syncope  possible from hypovolemia   rule out dig toxicity   ICD interrogation   monitor on tele   check RVP    afib  HR controlled  DC digoxin  a/c    FANY, hypokalemia  dc metolazone   monitor labs  consider renal eval    chronic systolic chf  warm on exam  cont BB  cont lasix  off metolazone  will add ace or arb once crt is normal   check echo     Murmur  history of HOCM  obtain echo

## 2018-04-02 NOTE — H&P ADULT - FAMILY HISTORY
Father  Still living? No  Family history of hypertension in father, Age at diagnosis: Age Unknown  Family history of coronary artery disease in father, Age at diagnosis: Age Unknown     Mother  Still living? Unknown  Family history of hypertension in mother, Age at diagnosis: Age Unknown

## 2018-04-02 NOTE — H&P ADULT - HISTORY OF PRESENT ILLNESS
54 y/o male with a hx of HOCM, CHF (EF 53%) s/p AICD, HTN, HLD, DM-II (diet controlled), hx CVA, CAD s/p CABG 1994, Asthma, Gout, chronic LE pain, Afib on Eliquis with c/o generalized weakness, intermittent episodes of chest tightness (non-radiating), poor appetite, and dizziness on standing with near syncope x 2 days with associated nausea. Pt reports baseline orthopnea (uses 6 pillows at night). Denies any palpitations, fever, chills, vomiting, abd pain, speech/visual changes, fall, HA, tremors, syncope, tinnitus, diaphoresis, numbness/tingling, cough, diarrhea, melena, recent travel, or recent illness. Pt with secondary c/o chronic LE pain, denies any increase in chronic LE edema.    In ED, pt received KCl 60 meq PO total for hypokalemia (K 3.0-hemolyzed) and percocet 5/325mg PO x 2 doses for chronic LE pain. Pt notes that he still has pain 10/10, but notes that this it's not changed from his baseline. EKG revealed

## 2018-04-02 NOTE — H&P ADULT - PROBLEM SELECTOR PLAN 9
Wheezing on exam  Will give Duoneb STAT  CXR (prelim): cardiomegaly, otherwise unremarkable, f/u official reading  Pulse ox monitoring

## 2018-04-03 ENCOUNTER — TRANSCRIPTION ENCOUNTER (OUTPATIENT)
Age: 53
End: 2018-04-03

## 2018-04-03 LAB
BUN SERPL-MCNC: 55 MG/DL — HIGH (ref 7–23)
CALCIUM SERPL-MCNC: 9.2 MG/DL — SIGNIFICANT CHANGE UP (ref 8.4–10.5)
CHLORIDE SERPL-SCNC: 98 MMOL/L — SIGNIFICANT CHANGE UP (ref 98–107)
CHLORIDE UR-SCNC: < 20 MMOL/L — SIGNIFICANT CHANGE UP
CO2 SERPL-SCNC: 31 MMOL/L — SIGNIFICANT CHANGE UP (ref 22–31)
CREAT ?TM UR-MCNC: 108.29 MG/DL — SIGNIFICANT CHANGE UP
CREAT SERPL-MCNC: 1.36 MG/DL — HIGH (ref 0.5–1.3)
GLUCOSE BLDC GLUCOMTR-MCNC: 100 MG/DL — HIGH (ref 70–99)
GLUCOSE BLDC GLUCOMTR-MCNC: 125 MG/DL — HIGH (ref 70–99)
GLUCOSE BLDC GLUCOMTR-MCNC: 141 MG/DL — HIGH (ref 70–99)
GLUCOSE BLDC GLUCOMTR-MCNC: 152 MG/DL — HIGH (ref 70–99)
GLUCOSE SERPL-MCNC: 120 MG/DL — HIGH (ref 70–99)
HCT VFR BLD CALC: 37 % — LOW (ref 39–50)
HGB BLD-MCNC: 11.3 G/DL — LOW (ref 13–17)
MAGNESIUM SERPL-MCNC: 2.1 MG/DL — SIGNIFICANT CHANGE UP (ref 1.6–2.6)
MCHC RBC-ENTMCNC: 28.8 PG — SIGNIFICANT CHANGE UP (ref 27–34)
MCHC RBC-ENTMCNC: 30.5 % — LOW (ref 32–36)
MCV RBC AUTO: 94.4 FL — SIGNIFICANT CHANGE UP (ref 80–100)
NRBC # FLD: 0 — SIGNIFICANT CHANGE UP
PLATELET # BLD AUTO: 130 K/UL — LOW (ref 150–400)
PMV BLD: 11.4 FL — SIGNIFICANT CHANGE UP (ref 7–13)
POTASSIUM SERPL-MCNC: 3.7 MMOL/L — SIGNIFICANT CHANGE UP (ref 3.5–5.3)
POTASSIUM SERPL-SCNC: 3.7 MMOL/L — SIGNIFICANT CHANGE UP (ref 3.5–5.3)
POTASSIUM UR-SCNC: 44.6 MMOL/L — SIGNIFICANT CHANGE UP
RBC # BLD: 3.92 M/UL — LOW (ref 4.2–5.8)
RBC # FLD: 16.4 % — HIGH (ref 10.3–14.5)
SODIUM SERPL-SCNC: 138 MMOL/L — SIGNIFICANT CHANGE UP (ref 135–145)
SODIUM UR-SCNC: < 20 MMOL/L — SIGNIFICANT CHANGE UP
WBC # BLD: 6.21 K/UL — SIGNIFICANT CHANGE UP (ref 3.8–10.5)
WBC # FLD AUTO: 6.21 K/UL — SIGNIFICANT CHANGE UP (ref 3.8–10.5)

## 2018-04-03 PROCEDURE — 93306 TTE W/DOPPLER COMPLETE: CPT | Mod: 26

## 2018-04-03 RX ADMIN — OXYCODONE AND ACETAMINOPHEN 2 TABLET(S): 5; 325 TABLET ORAL at 21:05

## 2018-04-03 RX ADMIN — MONTELUKAST 10 MILLIGRAM(S): 4 TABLET, CHEWABLE ORAL at 12:10

## 2018-04-03 RX ADMIN — ATORVASTATIN CALCIUM 10 MILLIGRAM(S): 80 TABLET, FILM COATED ORAL at 21:23

## 2018-04-03 RX ADMIN — OXYCODONE AND ACETAMINOPHEN 2 TABLET(S): 5; 325 TABLET ORAL at 06:59

## 2018-04-03 RX ADMIN — APIXABAN 5 MILLIGRAM(S): 2.5 TABLET, FILM COATED ORAL at 17:34

## 2018-04-03 RX ADMIN — OXYCODONE AND ACETAMINOPHEN 2 TABLET(S): 5; 325 TABLET ORAL at 00:11

## 2018-04-03 RX ADMIN — BUDESONIDE AND FORMOTEROL FUMARATE DIHYDRATE 2 PUFF(S): 160; 4.5 AEROSOL RESPIRATORY (INHALATION) at 21:24

## 2018-04-03 RX ADMIN — BUDESONIDE AND FORMOTEROL FUMARATE DIHYDRATE 2 PUFF(S): 160; 4.5 AEROSOL RESPIRATORY (INHALATION) at 14:06

## 2018-04-03 RX ADMIN — OXYCODONE AND ACETAMINOPHEN 2 TABLET(S): 5; 325 TABLET ORAL at 06:20

## 2018-04-03 RX ADMIN — OXYCODONE AND ACETAMINOPHEN 2 TABLET(S): 5; 325 TABLET ORAL at 20:34

## 2018-04-03 RX ADMIN — OXYCODONE AND ACETAMINOPHEN 2 TABLET(S): 5; 325 TABLET ORAL at 14:12

## 2018-04-03 RX ADMIN — OXYCODONE AND ACETAMINOPHEN 2 TABLET(S): 5; 325 TABLET ORAL at 17:09

## 2018-04-03 RX ADMIN — APIXABAN 5 MILLIGRAM(S): 2.5 TABLET, FILM COATED ORAL at 06:58

## 2018-04-03 RX ADMIN — Medication 50 MILLIGRAM(S): at 09:30

## 2018-04-03 RX ADMIN — Medication 0.6 MILLIGRAM(S): at 12:07

## 2018-04-03 NOTE — DISCHARGE NOTE ADULT - CARE PROVIDER_API CALL
Tatiana Cox  Cardiologist  Phone: (   )    -  Fax: (   )    - Tatiana Cox  Cardiologist  Phone: (   )    -  Fax: (   )    -    Gigi Huertas), Internal Medicine; Nephrology  [address] [address]  Phone: [phone]  Fax: [phone]

## 2018-04-03 NOTE — DISCHARGE NOTE ADULT - COMMUNITY RESOURCES
Home attendant services reinstated for tomorrow 4/4/18 as approved by [facility] [phone].  Transportation home via [employer] car service [phone]; trip#[phone] for 7pm this evening-IF PATIENT HAS HIS KEYS TO ENTER APT. Home attendant services reinstated for tomorrow 4/6/18 as approved by [facility] [phone].  Transportation home via [employer] car service [phone]; trip#[phone] for 6pm this evening. Please have patient in lobby at least 15minutes ahead of time

## 2018-04-03 NOTE — PROGRESS NOTE ADULT - SUBJECTIVE AND OBJECTIVE BOX
Subjective: Patient seen and examined. No new events except as noted.     SUBJECTIVE/ROS:  feels better       MEDICATIONS:  MEDICATIONS  (STANDING):  apixaban 5 milliGRAM(s) Oral every 12 hours  atorvastatin 10 milliGRAM(s) Oral at bedtime  buDESOnide 160 MICROgram(s)/formoterol 4.5 MICROgram(s) Inhaler 2 Puff(s) Inhalation two times a day  colchicine 0.6 milliGRAM(s) Oral daily  dextrose 5%. 1000 milliLiter(s) (50 mL/Hr) IV Continuous <Continuous>  dextrose 50% Injectable 12.5 Gram(s) IV Push once  dextrose 50% Injectable 25 Gram(s) IV Push once  dextrose 50% Injectable 25 Gram(s) IV Push once  insulin lispro (HumaLOG) corrective regimen sliding scale   SubCutaneous three times a day before meals  insulin lispro (HumaLOG) corrective regimen sliding scale   SubCutaneous at bedtime  metoprolol tartrate 50 milliGRAM(s) Oral every 12 hours  montelukast 10 milliGRAM(s) Oral daily      PHYSICAL EXAM:  T(C): 36.6 (04-03-18 @ 17:24), Max: 36.6 (04-03-18 @ 17:24)  HR: 62 (04-03-18 @ 17:24) (50 - 73)  BP: 94/63 (04-03-18 @ 17:24) (94/63 - 104/69)  RR: 16 (04-03-18 @ 17:24) (16 - 20)  SpO2: 100% (04-03-18 @ 17:24) (98% - 100%)  Wt(kg): --  I&O's Summary    Height (cm): 162.56 (04-03 @ 13:40)  Weight (kg): 58.6 (04-03 @ 13:40)  BMI (kg/m2): 22.2 (04-03 @ 13:40)  BSA (m2): 1.62 (04-03 @ 13:40)    JVP: Normal  Neck: supple  Lung: clear   CV: S1 S2 , Murmur:  Abd: soft  Ext: No edema  neuro: Awake / alert  Psych: flat affect  Skin: normal     LABS/DATA:    CARDIAC MARKERS:  CARDIAC MARKERS ( 02 Apr 2018 07:00 )  x     / < 0.06 ng/mL / 211 u/L / 2.57 ng/mL / x      CARDIAC MARKERS ( 02 Apr 2018 02:15 )  x     / < 0.06 ng/mL / 248 u/L / 2.46 ng/mL / x      CARDIAC MARKERS ( 01 Apr 2018 19:09 )  x     / < 0.06 ng/mL / 294 u/L / 2.71 ng/mL / x                                    11.3   6.21  )-----------( 130      ( 03 Apr 2018 06:30 )             37.0     04-03    138  |  98  |  55<H>  ----------------------------<  120<H>  3.7   |  31  |  1.36<H>    Ca    9.2      03 Apr 2018 06:30  Mg     2.1     04-03    TPro  7.6  /  Alb  3.6  /  TBili  2.0<H>  /  DBili  x   /  AST  24  /  ALT  12  /  AlkPhos  91  04-02    proBNP:   Lipid Profile:   HgA1c:   TSH:     TELE:  EKG:

## 2018-04-03 NOTE — PROGRESS NOTE ADULT - SUBJECTIVE AND OBJECTIVE BOX
No pain, no shortness of breath      VITAL:  T(C): , Max: 36.7 (04-02-18 @ 14:43)  T(F): , Max: 98 (04-02-18 @ 14:43)  HR: 50 (04-03-18 @ 06:12)  BP: 101/68 (04-03-18 @ 06:12)  BP(mean): --  RR: 18 (04-03-18 @ 06:12)  SpO2: 100% (04-03-18 @ 06:12)      PHYSICAL EXAM:  Constitutional: NAD, Alert; short  HEENT: NCAT, DMM  Neck: Supple, No JVD  Respiratory: CTA-b/l  Cardiovascular: RRR s1s2, 2/6 MG  Gastrointestinal: BS+, soft, NT/ND  Extremities: 1+ swelling b/l LE edema  Neurological: no focal deficits; strength grossly intact  Psychiatric: Normal mood, normal affect  Back: no CVAT b/l  Skin: (+)midsternal wound - clean-appearing; diffuse papular rash      LABS:                        11.3   6.21  )-----------( 130      ( 03 Apr 2018 06:30 )             37.0     Na(138)/K(3.7)/Cl(98)/HCO3(31)/BUN(55)/Cr(1.36)Glu(120)/Ca(9.2)/Mg(2.1)/PO4(--)    04-03 @ 06:30  Na(137)/K(3.5)/Cl(91)/HCO3(32)/BUN(55)/Cr(1.50)Glu(110)/Ca(9.2)/Mg(2.1)/PO4(--)    04-02 @ 06:00  Na(138)/K(3.0)/Cl(91)/HCO3(36)/BUN(54)/Cr(1.49)Glu(109)/Ca(9.0)/Mg(2.0)/PO4(--)    04-02 @ 02:15  Na(135)/K(3.0)/Cl(88)/HCO3(32)/BUN(55)/Cr(1.41)Glu(127)/Ca(9.6)/Mg(--)/PO4(--)    04-01 @ 19:09      IMAGING:     < from: US Renal (04.02.18 @ 12:57) >  Right kidney:  10 cm. No renal mass, hydronephrosis or calculi.  Left kidney:  10.6 cm. No renal mass, hydronephrosis or calculi.        ASSESSMENT: 53M w/ HTN, DM2, HOCM, CAD-CABG, Asthma, Gout, and AFib, 4/1/18 a/w CP/nausea/dizziness for 2 days    (1)Renal - BUN/creat of 18/0.88 back in 9/2016 - numbers worse at present; unclear as to whether his azotemia represents FANY or his new baseline. Appears that there is no accessible bloodwork of late from outpatient setting for comparison. BUN stable relative to yesterday; creatinine mildly improved. Quite possible that his azotemia is from prerenal azotemia from overdiuresis. He is off diuretics for now.    (2)Hypokalemia - whole-body deficit due to diuretics. Improving with repletion.    (3)Metablic alkalosis - likely due to contraction from diuretics.    (4)CV - dizziness on admission - nonorthostatic. Cardiology input appreciated.      RECOMMEND:  (1)No IVF/No diuretics for now  (2)Can d/c orthostatic checks  (3)F/U urine lytes  (4)F/U TTE      Gigi Huertas MD  [facility]  [phone] (+)chest tightness; (+)dizziness    VITAL:  T(C): , Max: 36.7 (04-02-18 @ 14:43)  T(F): , Max: 98 (04-02-18 @ 14:43)  HR: 50 (04-03-18 @ 06:12)  BP: 101/68 (04-03-18 @ 06:12)  BP(mean): --  RR: 18 (04-03-18 @ 06:12)  SpO2: 100% (04-03-18 @ 06:12)      PHYSICAL EXAM:  Constitutional: NAD, Alert; short  HEENT: NCAT, DMM  Neck: Supple, No JVD  Respiratory: CTA-b/l  Chest: (+)left upper chest ICD - incision clean-appearing  Cardiovascular: RRR s1s2, 2/6 MG  Gastrointestinal: BS+, soft, NT/ND  Extremities: 1+ swelling b/l LE edema  Neurological: no focal deficits; strength grossly intact  Psychiatric: Normal mood, normal affect  Back: no CVAT b/l  Skin: (+)midsternal wound - clean-appearing; diffuse papular rash      LABS:                        11.3   6.21  )-----------( 130      ( 03 Apr 2018 06:30 )             37.0     Na(138)/K(3.7)/Cl(98)/HCO3(31)/BUN(55)/Cr(1.36)Glu(120)/Ca(9.2)/Mg(2.1)/PO4(--)    04-03 @ 06:30  Na(137)/K(3.5)/Cl(91)/HCO3(32)/BUN(55)/Cr(1.50)Glu(110)/Ca(9.2)/Mg(2.1)/PO4(--)    04-02 @ 06:00  Na(138)/K(3.0)/Cl(91)/HCO3(36)/BUN(54)/Cr(1.49)Glu(109)/Ca(9.0)/Mg(2.0)/PO4(--)    04-02 @ 02:15  Na(135)/K(3.0)/Cl(88)/HCO3(32)/BUN(55)/Cr(1.41)Glu(127)/Ca(9.6)/Mg(--)/PO4(--)    04-01 @ 19:09      IMAGING:     < from: US Renal (04.02.18 @ 12:57) >  Right kidney:  10 cm. No renal mass, hydronephrosis or calculi.  Left kidney:  10.6 cm. No renal mass, hydronephrosis or calculi.        ASSESSMENT: 53M w/ HTN, DM2, HOCM, CAD-CABG, Asthma, Gout, and AFib, 4/1/18 a/w CP/nausea/dizziness for 2 days    (1)Renal - BUN/creat of 18/0.88 back in 9/2016 - numbers worse at present; unclear as to whether his azotemia represents FANY or his new baseline. Appears that there is no accessible bloodwork of late from outpatient setting for comparison. BUN stable relative to yesterday; creatinine mildly improved. Quite possible that his azotemia is from prerenal azotemia from overdiuresis. He is off diuretics for now.    (2)Hypokalemia - whole-body deficit due to diuretics. Improving with repletion.    (3)Metablic alkalosis - likely due to contraction from diuretics.    (4)CV - dizziness on admission - nonorthostatic. Cardiology input appreciated.      RECOMMEND:  (1)No IVF/No diuretics for now  (2)Can d/c orthostatic checks  (3)F/U urine lytes  (4)F/U TTE  (5)Ischemic workup per Cardiology- would treat periprocedurally with isotonic IVF if for cath (250cc bolus pre-cath; 125cc/h x 4h post cath)    Gigi Huertas MD  [facility]  [phone]

## 2018-04-03 NOTE — DISCHARGE NOTE ADULT - ADDITIONAL INSTRUCTIONS
follow up with your outpatient Cardiologist Dr. Cox for further workup  follow up with Dr. Huertas in 2 weeks to monitor your kidney function

## 2018-04-03 NOTE — DISCHARGE NOTE ADULT - MEDICATION SUMMARY - MEDICATIONS TO STOP TAKING
I will STOP taking the medications listed below when I get home from the hospital:    digoxin 125 mcg (0.125 mg) oral tablet  -- 1 tab(s) by mouth once a day    metOLazone 2.5 mg oral tablet  -- 1 tab(s) by mouth once a day Take 30 mins before lasix

## 2018-04-03 NOTE — DISCHARGE NOTE ADULT - PROVIDER TOKENS
FREE:[LAST:[Brooke],FIRST:[Tatiana],PHONE:[(   )    -],FAX:[(   )    -],ADDRESS:[Cardiologist]] FREE:[LAST:[Brooke],FIRST:[Tatiana],PHONE:[(   )    -],FAX:[(   )    -],ADDRESS:[Cardiologist]],TOKEN:'[id number]'

## 2018-04-03 NOTE — DISCHARGE NOTE ADULT - SECONDARY DIAGNOSIS.
Severe mitral regurgitation Moderate tricuspid regurgitation Moderate aortic regurgitation FANY (acute kidney injury) Atrial fibrillation

## 2018-04-03 NOTE — DISCHARGE NOTE ADULT - MEDICATION SUMMARY - MEDICATIONS TO TAKE
I will START or STAY ON the medications listed below when I get home from the hospital:    apixaban 5 mg oral tablet  -- 1 tab(s) by mouth 2 times a day  -- Indication: For Atrial fibrillation    colchicine 0.6 mg oral tablet  -- 1 tab(s) by mouth once a day  -- Indication: For Gout    atorvastatin 10 mg oral tablet  -- 1 tab(s) by mouth once a day (at bedtime)  -- Indication: For Hyperlipidemia    metoprolol tartrate 50 mg oral tablet  -- 1 tab(s) by mouth 2 times a day  -- Indication: For Atrial fibrillation    Advair Diskus 250 mcg-50 mcg inhalation powder  -- 1 puff(s) inhaled 2 times a day  -- Indication: For Asthma    albuterol 90 mcg/inh inhalation aerosol  -- 2 puff(s) inhaled every 6 hours, As Needed  -- Indication: For Asthma    furosemide 80 mg oral tablet  -- 1 tab(s) by mouth once a day  -- Indication: For ChF    Singulair 10 mg oral tablet  -- 1 tab(s) by mouth once a day  -- Indication: For Asthma

## 2018-04-03 NOTE — DISCHARGE NOTE ADULT - HOSPITAL COURSE
52 y/o male with a hx of HOCM, CHF (EF 53%) s/p AICD, HTN, HLD, DM-II (diet controlled), hx CVA, CAD s/p CABG 1994, Asthma, Gout, chronic LE pain, Afib on Eliquis with c/o generalized weakness, intermittent episodes of chest tightness (non-radiating), poor appetite, and dizziness on standing with near syncope x 2 days with associated nausea. Pt reports baseline orthopnea (uses 6 pillows at night). Denies any palpitations, fever, chills, vomiting, abd pain, speech/visual changes, fall, HA, tremors, syncope, tinnitus, diaphoresis, numbness/tingling, cough, diarrhea, melena, recent travel, or recent illness. Pt with secondary c/o chronic LE pain, denies any increase in chronic LE edema.    In ED, pt received KCl 60 meq PO total for hypokalemia (K 3.0-hemolyzed) and percocet 5/325mg PO x 2 doses for chronic LE pain. Pt notes that he still has pain 10/10, but notes that this it's not changed from his baseline. EKG revealed   + Near syncope- cards following (Dr. Choi), ICD interrogation negative for events, possibly secondary to hypovolemia, S/P 1/2L NS in ED, Metolazone/Lasix held  + FANY- renal following (Dr. Huertas), renal US normal, S/P 1/2L NS in ED, Metolazone and Lasix held  + Hypokalemia- S/P PO supplementation  +severe MR, mod-severe TR, moderate AR 52 y/o male with a hx of HOCM, CHF (EF 53%) s/p AICD, HTN, HLD, DM-II (diet controlled), hx CVA, CAD s/p CABG 1994, Asthma, Gout, chronic LE pain, Afib on Eliquis with c/o generalized weakness, intermittent episodes of chest tightness (non-radiating), poor appetite, and dizziness on standing with near syncope x 2 days with associated nausea. Pt reports baseline orthopnea (uses 6 pillows at night). Denies any palpitations, fever, chills, vomiting, abd pain, speech/visual changes, fall, HA, tremors, syncope, tinnitus, diaphoresis, numbness/tingling, cough, diarrhea, melena, recent travel, or recent illness. Pt with secondary c/o chronic LE pain, denies any increase in chronic LE edema.    In ED, pt received KCl 60 meq PO total for hypokalemia (K 3.0-hemolyzed) and percocet 5/325mg PO x 2 doses for chronic LE pain. Pt notes that he still has pain 10/10, but notes that this it's not changed from his baseline. EKG revealed   + Near syncope- cards following (Dr. Choi), ICD interrogation negative for events, possibly secondary to hypovolemia, S/P 1/2L NS in ED, Metolazone/Lasix held  + FANY- renal following (Dr. Huertas), renal US normal, S/P 1/2L NS in ED, Metolazone and Lasix dose changed   + Hypokalemia- S/P PO supplementation  +severe MR, mod-severe TR, moderate AR    Outpatient f/u with Dr. Cox for further workup regarding valvular disease.

## 2018-04-03 NOTE — DISCHARGE NOTE ADULT - PATIENT PORTAL LINK FT
You can access the [facility], offered by [facility], by registering with the following website: [email or web address]

## 2018-04-03 NOTE — PROGRESS NOTE ADULT - SUBJECTIVE AND OBJECTIVE BOX
Patient is a 53y old  Male who presents with a chief complaint of "I feel chest tightness and feel dizzy" (03 Apr 2018 11:03)      INTERVAL HPI/OVERNIGHT EVENTS:  T(C): 36.6 (04-03-18 @ 17:24), Max: 36.6 (04-03-18 @ 17:24)  HR: 62 (04-03-18 @ 17:24) (50 - 73)  BP: 94/63 (04-03-18 @ 17:24) (94/63 - 104/69)  RR: 16 (04-03-18 @ 17:24) (16 - 20)  SpO2: 100% (04-03-18 @ 17:24) (99% - 100%)  Wt(kg): --  I&O's Summary      LABS:                        11.3   6.21  )-----------( 130      ( 03 Apr 2018 06:30 )             37.0     04-03    138  |  98  |  55<H>  ----------------------------<  120<H>  3.7   |  31  |  1.36<H>    Ca    9.2      03 Apr 2018 06:30  Mg     2.1     04-03    TPro  7.6  /  Alb  3.6  /  TBili  2.0<H>  /  DBili  x   /  AST  24  /  ALT  12  /  AlkPhos  91  04-02        CAPILLARY BLOOD GLUCOSE      POCT Blood Glucose.: 100 mg/dL (03 Apr 2018 16:55)  POCT Blood Glucose.: 141 mg/dL (03 Apr 2018 12:10)  POCT Blood Glucose.: 125 mg/dL (03 Apr 2018 08:47)  POCT Blood Glucose.: 212 mg/dL (02 Apr 2018 22:40)            MEDICATIONS  (STANDING):  apixaban 5 milliGRAM(s) Oral every 12 hours  atorvastatin 10 milliGRAM(s) Oral at bedtime  buDESOnide 160 MICROgram(s)/formoterol 4.5 MICROgram(s) Inhaler 2 Puff(s) Inhalation two times a day  colchicine 0.6 milliGRAM(s) Oral daily  dextrose 5%. 1000 milliLiter(s) (50 mL/Hr) IV Continuous <Continuous>  dextrose 50% Injectable 12.5 Gram(s) IV Push once  dextrose 50% Injectable 25 Gram(s) IV Push once  dextrose 50% Injectable 25 Gram(s) IV Push once  insulin lispro (HumaLOG) corrective regimen sliding scale   SubCutaneous three times a day before meals  insulin lispro (HumaLOG) corrective regimen sliding scale   SubCutaneous at bedtime  metoprolol tartrate 50 milliGRAM(s) Oral every 12 hours  montelukast 10 milliGRAM(s) Oral daily    MEDICATIONS  (PRN):  ALBUTerol    90 MICROgram(s) HFA Inhaler 2 Puff(s) Inhalation every 6 hours PRN Shortness of Breath and/or Wheezing  dextrose Gel 1 Dose(s) Oral once PRN Blood Glucose LESS THAN 70 milliGRAM(s)/deciliter  glucagon  Injectable 1 milliGRAM(s) IntraMuscular once PRN Glucose LESS THAN 70 milligrams/deciliter  oxyCODONE    5 mG/acetaminophen 325 mG 2 Tablet(s) Oral every 6 hours PRN Moderate to severe pain          PHYSICAL EXAM:  GENERAL: NAD, well-groomed, well-developed  HEAD:  Atraumatic, Normocephalic  CHEST/LUNG: Clear to percussion bilaterally; No rales, rhonchi, wheezing, or rubs  HEART: Regular rate and rhythm; No murmurs, rubs, or gallops  ABDOMEN: Soft, Nontender, Nondistended; Bowel sounds present  EXTREMITIES:  2+ Peripheral Pulses, No clubbing, cyanosis, or edema  LYMPH: No lymphadenopathy noted  SKIN: No rashes or lesions    Care Discussed with Consultants/Other Providers [ ] YES  [ ] NO

## 2018-04-03 NOTE — DISCHARGE NOTE ADULT - CARE PLAN
Principal Discharge DX:	Near syncope  Secondary Diagnosis:	Severe mitral regurgitation  Secondary Diagnosis:	Moderate tricuspid regurgitation  Secondary Diagnosis:	Moderate aortic regurgitation  Secondary Diagnosis:	FANY (acute kidney injury)  Secondary Diagnosis:	Atrial fibrillation Principal Discharge DX:	Near syncope  Goal:	prevent further episode  Assessment and plan of treatment:	likely from dehydration, your diuretics were changed and you were given intravenous fluids  Secondary Diagnosis:	Severe mitral regurgitation  Assessment and plan of treatment:	follow up with your outpatient Cardiologist Dr. Cox for further workup  Secondary Diagnosis:	Moderate tricuspid regurgitation  Assessment and plan of treatment:	follow up with your outpatient Cardiologist Dr. Cox for further workup  Secondary Diagnosis:	Moderate aortic regurgitation  Assessment and plan of treatment:	follow up with your outpatient Cardiologist Dr. Cox for further workup  Secondary Diagnosis:	FANY (acute kidney injury)  Assessment and plan of treatment:	follow up with Dr. Huertas in 2 weeks to monitor your kidney function, your lasix dose was changed to 80mg daily and your metolazone was discontinued  Secondary Diagnosis:	Atrial fibrillation  Assessment and plan of treatment:	continue your Metoprolol and your digoxin was discontinued, continue eliquis for anticoagulation

## 2018-04-03 NOTE — PROGRESS NOTE ADULT - ASSESSMENT
Near Syncope  possible from hypovolemia   ICD interrogation appreciated   monitor on tele   echo    afib  HR controlled  off digoxin  a/c    FANY, hypokalemia  off metolazone   monitor labs  fu with renal eval    chronic systolic chf  warm on exam  cont BB  cont lasix  off metolazone  will add ace or arb once crt is normal   check echo     Murmur  history of HOCM  obtain echo

## 2018-04-03 NOTE — DISCHARGE NOTE ADULT - MEDICATION SUMMARY - MEDICATIONS TO CHANGE
I will SWITCH the dose or number of times a day I take the medications listed below when I get home from the hospital:    Lasix  -- 80 milligram(s) by mouth 2 times a day

## 2018-04-04 LAB
BUN SERPL-MCNC: 47 MG/DL — HIGH (ref 7–23)
CALCIUM SERPL-MCNC: 9.2 MG/DL — SIGNIFICANT CHANGE UP (ref 8.4–10.5)
CHLORIDE SERPL-SCNC: 96 MMOL/L — LOW (ref 98–107)
CO2 SERPL-SCNC: 30 MMOL/L — SIGNIFICANT CHANGE UP (ref 22–31)
CREAT SERPL-MCNC: 1.09 MG/DL — SIGNIFICANT CHANGE UP (ref 0.5–1.3)
GLUCOSE BLDC GLUCOMTR-MCNC: 103 MG/DL — HIGH (ref 70–99)
GLUCOSE BLDC GLUCOMTR-MCNC: 111 MG/DL — HIGH (ref 70–99)
GLUCOSE BLDC GLUCOMTR-MCNC: 114 MG/DL — HIGH (ref 70–99)
GLUCOSE BLDC GLUCOMTR-MCNC: 206 MG/DL — HIGH (ref 70–99)
GLUCOSE SERPL-MCNC: 86 MG/DL — SIGNIFICANT CHANGE UP (ref 70–99)
POTASSIUM SERPL-MCNC: 3.5 MMOL/L — SIGNIFICANT CHANGE UP (ref 3.5–5.3)
POTASSIUM SERPL-SCNC: 3.5 MMOL/L — SIGNIFICANT CHANGE UP (ref 3.5–5.3)
PROT UR-MCNC: > 200 MG/DL — SIGNIFICANT CHANGE UP
SODIUM SERPL-SCNC: 136 MMOL/L — SIGNIFICANT CHANGE UP (ref 135–145)

## 2018-04-04 RX ORDER — POTASSIUM CHLORIDE 20 MEQ
40 PACKET (EA) ORAL ONCE
Qty: 0 | Refills: 0 | Status: COMPLETED | OUTPATIENT
Start: 2018-04-04 | End: 2018-04-04

## 2018-04-04 RX ORDER — FUROSEMIDE 40 MG
80 TABLET ORAL DAILY
Qty: 0 | Refills: 0 | Status: DISCONTINUED | OUTPATIENT
Start: 2018-04-04 | End: 2018-04-05

## 2018-04-04 RX ADMIN — BUDESONIDE AND FORMOTEROL FUMARATE DIHYDRATE 2 PUFF(S): 160; 4.5 AEROSOL RESPIRATORY (INHALATION) at 11:39

## 2018-04-04 RX ADMIN — APIXABAN 5 MILLIGRAM(S): 2.5 TABLET, FILM COATED ORAL at 18:09

## 2018-04-04 RX ADMIN — APIXABAN 5 MILLIGRAM(S): 2.5 TABLET, FILM COATED ORAL at 05:24

## 2018-04-04 RX ADMIN — ATORVASTATIN CALCIUM 10 MILLIGRAM(S): 80 TABLET, FILM COATED ORAL at 22:15

## 2018-04-04 RX ADMIN — Medication 2: at 13:48

## 2018-04-04 RX ADMIN — Medication 40 MILLIEQUIVALENT(S): at 11:39

## 2018-04-04 RX ADMIN — OXYCODONE AND ACETAMINOPHEN 2 TABLET(S): 5; 325 TABLET ORAL at 05:54

## 2018-04-04 RX ADMIN — OXYCODONE AND ACETAMINOPHEN 2 TABLET(S): 5; 325 TABLET ORAL at 05:24

## 2018-04-04 RX ADMIN — MONTELUKAST 10 MILLIGRAM(S): 4 TABLET, CHEWABLE ORAL at 11:52

## 2018-04-04 RX ADMIN — Medication 50 MILLIGRAM(S): at 18:09

## 2018-04-04 RX ADMIN — BUDESONIDE AND FORMOTEROL FUMARATE DIHYDRATE 2 PUFF(S): 160; 4.5 AEROSOL RESPIRATORY (INHALATION) at 22:15

## 2018-04-04 RX ADMIN — OXYCODONE AND ACETAMINOPHEN 2 TABLET(S): 5; 325 TABLET ORAL at 12:20

## 2018-04-04 RX ADMIN — Medication 80 MILLIGRAM(S): at 22:15

## 2018-04-04 RX ADMIN — Medication 0.6 MILLIGRAM(S): at 11:52

## 2018-04-04 RX ADMIN — OXYCODONE AND ACETAMINOPHEN 2 TABLET(S): 5; 325 TABLET ORAL at 11:40

## 2018-04-04 RX ADMIN — OXYCODONE AND ACETAMINOPHEN 2 TABLET(S): 5; 325 TABLET ORAL at 18:27

## 2018-04-04 RX ADMIN — OXYCODONE AND ACETAMINOPHEN 2 TABLET(S): 5; 325 TABLET ORAL at 19:30

## 2018-04-04 NOTE — PROGRESS NOTE ADULT - ASSESSMENT
Near Syncope  possible from hypovolemia   ICD interrogation appreciated   monitor on tele    afib  HR controlled  off digoxin  a/c    FANY, hypokalemia  off metolazone   improving     chronic systolic chf  warm on exam  cont BB  cont lasix  off metolazone    severe MR  given afib and pulm HTN and mildly reduced ef , pt needs MVRe  pt to think to see if he wants the work up here, if he does , will get BEENA

## 2018-04-04 NOTE — PROGRESS NOTE ADULT - SUBJECTIVE AND OBJECTIVE BOX
Subjective: Patient seen and examined. No new events except as noted.     SUBJECTIVE/ROS:  feels better today       MEDICATIONS:  MEDICATIONS  (STANDING):  apixaban 5 milliGRAM(s) Oral every 12 hours  atorvastatin 10 milliGRAM(s) Oral at bedtime  buDESOnide 160 MICROgram(s)/formoterol 4.5 MICROgram(s) Inhaler 2 Puff(s) Inhalation two times a day  colchicine 0.6 milliGRAM(s) Oral daily  dextrose 5%. 1000 milliLiter(s) (50 mL/Hr) IV Continuous <Continuous>  dextrose 50% Injectable 12.5 Gram(s) IV Push once  dextrose 50% Injectable 25 Gram(s) IV Push once  dextrose 50% Injectable 25 Gram(s) IV Push once  insulin lispro (HumaLOG) corrective regimen sliding scale   SubCutaneous three times a day before meals  insulin lispro (HumaLOG) corrective regimen sliding scale   SubCutaneous at bedtime  metoprolol tartrate 50 milliGRAM(s) Oral every 12 hours  montelukast 10 milliGRAM(s) Oral daily      PHYSICAL EXAM:  T(C): 36.6 (04-04-18 @ 05:25), Max: 36.6 (04-03-18 @ 17:24)  HR: 554 (04-04-18 @ 05:25) (51 - 554)  BP: 100/66 (04-04-18 @ 05:25) (94/63 - 106/65)  RR: 16 (04-04-18 @ 05:25) (16 - 16)  SpO2: 100% (04-04-18 @ 05:25) (100% - 100%)  Wt(kg): --  I&O's Summary    Height (cm): 162.56 (04-03 @ 13:40)  Weight (kg): 58.6 (04-03 @ 13:40)  BMI (kg/m2): 22.2 (04-03 @ 13:40)  BSA (m2): 1.62 (04-03 @ 13:40)    JVP: Normal  Neck: supple  Lung: clear   CV: S1 S2 , Murmur:  Abd: soft  Ext: No edema  neuro: Awake / alert  Psych: flat affect  Skin: normal       LABS/DATA:    CARDIAC MARKERS:  CARDIAC MARKERS ( 02 Apr 2018 07:00 )  x     / < 0.06 ng/mL / 211 u/L / 2.57 ng/mL / x      CARDIAC MARKERS ( 02 Apr 2018 02:15 )  x     / < 0.06 ng/mL / 248 u/L / 2.46 ng/mL / x      CARDIAC MARKERS ( 01 Apr 2018 19:09 )  x     / < 0.06 ng/mL / 294 u/L / 2.71 ng/mL / x                                    11.3   6.21  )-----------( 130      ( 03 Apr 2018 06:30 )             37.0     04-04    136  |  96<L>  |  47<H>  ----------------------------<  86  3.5   |  30  |  1.09    Ca    9.2      04 Apr 2018 05:10  Mg     2.1     04-03      proBNP:   Lipid Profile:   HgA1c:   TSH:     TELE:  EKG:    < from: TTE with Doppler (w/Cont) (04.03.18 @ 16:19) >  CONCLUSIONS:  1. Mitral annular calcification, otherwise normal mitral  valve. Severe mitral regurgitation.  2. Calcified trileaflet aortic valve with normal opening.  Moderate aortic regurgitation.  3. Severely dilated left atrium.  LA volume index = 52  cc/m2.  4. Mild left ventricular enlargement.  5. Endocardium not well visualized; grossly mild global  left ventricular systolic dysfunction.  Endocardial  visualization enhanced with intravenous injection of echo  contrast (Definity).  6. Severe right atrial enlargement.  7. Right ventricular enlargement with decreased right  ventricular systolic function.  8. Normal tricuspid valve.  Moderate-severe tricuspid  regurgitation.    < end of copied text >

## 2018-04-04 NOTE — PROGRESS NOTE ADULT - ASSESSMENT
53M w/ HTN, DM2, HOCM, CAD-CABG, Asthma, Gout, and AFib, 4/1/18 a/w CP/nausea/dizziness for 2 days found to have SEVERE MR.     (1)Renal - BUN/creat of 18/0.88 back in 9/2016 - numbers worse at present; unclear as to whether his azotemia represents FANY or his new baseline. Appears that there is no accessible bloodwork of late from outpatient setting for comparison. Quite possible that his azotemia is from prerenal azotemia from overdiuresis. He is off diuretics for now. Renal function improving    (2)Hypokalemia - whole-body deficit due to diuretics. Improving with repletion.    (3)Metablic alkalosis - likely due to contraction from diuretics. improving    (4)CV - dizziness on admission - nonorthostatic. Severe, per cardiology needs MVRe, if pt decides to have procedure here will need BEENA      RECOMMEND:  - consider resuming low dose lasix in AM (lasix 40 mg po bid)  - trend renal function while admitted  - f/u pt decision Re: MVRe in this hospital or an OSH  - avoid NSAIDs and nephrotoxins as able    Vani Mak NP  [facility]  [phone] 53M w/ HTN, DM2, HOCM, CAD-CABG, Asthma, Gout, and AFib, 4/1/18 a/w CP/nausea/dizziness for 2 days found to have SEVERE MR.     (1)Renal - BUN/creat of 18/0.88 back in 9/2016 - numbers worse at present; unclear as to whether his azotemia represents FANY or his new baseline. Appears that there is no accessible bloodwork of late from outpatient setting for comparison. Quite possible that his azotemia is from prerenal azotemia from overdiuresis. He is off diuretics for now. Renal function improving    (2)Hypokalemia - whole-body deficit due to diuretics. Improving with repletion.    (3)Metablic alkalosis - likely due to contraction from diuretics. improving    (4)CV - dizziness on admission - nonorthostatic. Severe, per cardiology needs MVRe, if pt decides to have procedure here will need BEENA      RECOMMEND:  - consider resuming low dose lasix in AM (lasix 40 mg po bid)  - trend renal function while admitted  - f/u pt decision Re: MVRe in this hospital or an OSH  - avoid NSAIDs and nephrotoxins as able    Vani Mak NP  [facility]  [phone]       ***************************RENAL ATTENDING****************************************************************  Seen/examined with NP. I agree with NP assessment as above.    General: NAD, A+Ox3  Pulm: CTA-b/l  Card: RRR s1s2  Ext: 1+ b/l LE edema      ASSESSMENT:  (1)Renal - FANY from prerenal azotemia - resolved  (2)CV - hypervolemia - indicated that we restart diuretics at this point  (3)CTS - severe MR - for surgical repair?    RECOMMEND:  (1)Lasix 80mg po qd  (2)Surgical repair per Cardiology/CTS    Gigi Huertas MD  [facility]  [phone]

## 2018-04-04 NOTE — PROGRESS NOTE ADULT - SUBJECTIVE AND OBJECTIVE BOX
NEPHROLOGY - NSN    Patient seen and examined. Found sitting in tri-pod position denies dyspnea or any discomfort. No significant events noted overnight. BP on low side.     MEDICATIONS  (STANDING):  apixaban 5 milliGRAM(s) Oral every 12 hours  atorvastatin 10 milliGRAM(s) Oral at bedtime  buDESOnide 160 MICROgram(s)/formoterol 4.5 MICROgram(s) Inhaler 2 Puff(s) Inhalation two times a day  colchicine 0.6 milliGRAM(s) Oral daily  dextrose 5%. 1000 milliLiter(s) (50 mL/Hr) IV Continuous <Continuous>  dextrose 50% Injectable 12.5 Gram(s) IV Push once  dextrose 50% Injectable 25 Gram(s) IV Push once  dextrose 50% Injectable 25 Gram(s) IV Push once  insulin lispro (HumaLOG) corrective regimen sliding scale   SubCutaneous three times a day before meals  insulin lispro (HumaLOG) corrective regimen sliding scale   SubCutaneous at bedtime  metoprolol tartrate 50 milliGRAM(s) Oral every 12 hours  montelukast 10 milliGRAM(s) Oral daily    VITALS:  T(C): , Max: 36.6 (04-03-18 @ 17:24)  T(F): , Max: 97.9 (04-03-18 @ 17:24)  HR: 554 (04-04-18 @ 05:25)  BP: 100/66 (04-04-18 @ 05:25)  BP(mean): --  RR: 16 (04-04-18 @ 05:25)  SpO2: 100% (04-04-18 @ 05:25)  Wt(kg): --  I and O's:    Height (cm): 162.56 (04-03 @ 13:40)  Weight (kg): 58.6 (04-03 @ 13:40)  BMI (kg/m2): 22.2 (04-03 @ 13:40)  BSA (m2): 1.62 (04-03 @ 13:40)    REVIEW OF SYSTEMS:  Denies any nausea, vomiting, diarrhea, fever or chills. Denies chest pain, SOB, focal weakness, hematuria or dysuria. Good oral intake and denies fatigue or weakness. All other pertinent systems are reviewed and are negative.     PHYSICAL EXAM:  Constitutional: NAD  Respiratory: CTA B/L  Cardiovascular: S1 and S2  Gastrointestinal: BS+, soft, non-tender, + distended  Extremities: + edema  Neurological: AAO x 3  : No [place]  Access: Not applicable  Skin: diffuse papular rash    LABS:                        11.3   6.21  )-----------( 130      ( 03 Apr 2018 06:30 )             37.0     04-04    136  |  96<L>  |  47<H>  ----------------------------<  86  3.5   |  30  |  1.09    Ca    9.2      04 Apr 2018 05:10  Mg     2.1     04-03        Urine Studies:    Potassium, Random Urine: 44.6 mmol/L (04-03 @ 13:25)  Sodium, Random Urine: < 20 mmol/L (04-03 @ 13:25)  Chloride, Random Urine: < 20 mmol/L (04-03 @ 13:25)  Creatinine, Random Urine: 108.29 mg/dL (04-03 @ 13:25)      RADIOLOGY & ADDITIONAL STUDIES:  < from: US Renal (04.02.18 @ 12:57) >  EXAM:  US KIDNEY(S)    PROCEDURE DATE:  Apr 2 2018   INTERPRETATION:  CLINICAL INFORMATION: 53-year-old man with acute kidney   injury.  COMPARISON: None available.  TECHNIQUE: Sonography of the kidneys and bladder.   FINDINGS:  Right kidney:  10 cm. No renal mass, hydronephrosis or calculi.  Left kidney:  10.6 cm. No renal mass, hydronephrosis or calculi.  Urinary bladder: Within normal limits.    IMPRESSION:   Normal renal ultrasound.  MAKENNA ROA M.D., ATTENDING RADIOLOGIST  This document has been electronically signed. Apr 2 2018  1:09PM  < end of copied text >    TTE:  ------------------------------------------------------------------------  CONCLUSIONS:  1. Mitral annular calcification, otherwise normal mitral  valve. Severe mitral regurgitation.  2. Calcified trileaflet aortic valve with normal opening.  Moderate aortic regurgitation.  3. Severely dilated left atrium.  LA volume index = 52  cc/m2.  4. Mild left ventricular enlargement.  5. Endocardium not well visualized; grossly mild global  left ventricular systolic dysfunction.  Endocardial  visualization enhanced with intravenous injection of echo  contrast (Definity).  6. Severe right atrial enlargement.  7. Right ventricular enlargement with decreased right  ventricular systolic function.  8. Normal tricuspid valve.  Moderate-severe tricuspid  regurgitation.  ------------------------------------------------------------------------  Confirmed on  4/3/2018 - 18:17:33 by Sean Bernard M.D.

## 2018-04-04 NOTE — PROGRESS NOTE ADULT - SUBJECTIVE AND OBJECTIVE BOX
Patient is a 53y old  Male who presents with a chief complaint of "I feel chest tightness and feel dizzy" (03 Apr 2018 11:03)      INTERVAL HPI/OVERNIGHT EVENTS:  T(C): 36.9 (04-04-18 @ 18:04), Max: 36.9 (04-04-18 @ 18:04)  HR: 62 (04-04-18 @ 18:04) (51 - 62)  BP: 114/73 (04-04-18 @ 18:04) (100/66 - 114/73)  RR: 16 (04-04-18 @ 18:04) (16 - 16)  SpO2: 100% (04-04-18 @ 18:04) (100% - 100%)  Wt(kg): --  I&O's Summary      LABS:                        11.3   6.21  )-----------( 130      ( 03 Apr 2018 06:30 )             37.0     04-04    136  |  96<L>  |  47<H>  ----------------------------<  86  3.5   |  30  |  1.09    Ca    9.2      04 Apr 2018 05:10  Mg     2.1     04-03          CAPILLARY BLOOD GLUCOSE      POCT Blood Glucose.: 114 mg/dL (04 Apr 2018 17:20)  POCT Blood Glucose.: 206 mg/dL (04 Apr 2018 11:57)  POCT Blood Glucose.: 103 mg/dL (04 Apr 2018 08:31)  POCT Blood Glucose.: 152 mg/dL (03 Apr 2018 21:44)            MEDICATIONS  (STANDING):  apixaban 5 milliGRAM(s) Oral every 12 hours  atorvastatin 10 milliGRAM(s) Oral at bedtime  buDESOnide 160 MICROgram(s)/formoterol 4.5 MICROgram(s) Inhaler 2 Puff(s) Inhalation two times a day  colchicine 0.6 milliGRAM(s) Oral daily  dextrose 5%. 1000 milliLiter(s) (50 mL/Hr) IV Continuous <Continuous>  dextrose 50% Injectable 12.5 Gram(s) IV Push once  dextrose 50% Injectable 25 Gram(s) IV Push once  dextrose 50% Injectable 25 Gram(s) IV Push once  furosemide    Tablet 80 milliGRAM(s) Oral daily  insulin lispro (HumaLOG) corrective regimen sliding scale   SubCutaneous three times a day before meals  insulin lispro (HumaLOG) corrective regimen sliding scale   SubCutaneous at bedtime  metoprolol tartrate 50 milliGRAM(s) Oral every 12 hours  montelukast 10 milliGRAM(s) Oral daily    MEDICATIONS  (PRN):  ALBUTerol    90 MICROgram(s) HFA Inhaler 2 Puff(s) Inhalation every 6 hours PRN Shortness of Breath and/or Wheezing  dextrose Gel 1 Dose(s) Oral once PRN Blood Glucose LESS THAN 70 milliGRAM(s)/deciliter  glucagon  Injectable 1 milliGRAM(s) IntraMuscular once PRN Glucose LESS THAN 70 milligrams/deciliter  oxyCODONE    5 mG/acetaminophen 325 mG 2 Tablet(s) Oral every 6 hours PRN Moderate to severe pain          PHYSICAL EXAM:  GENERAL: NAD, well-groomed, well-developed  HEAD:  Atraumatic, Normocephalic  CHEST/LUNG: Clear to percussion bilaterally; No rales, rhonchi, wheezing, or rubs  HEART: Regular rate and rhythm; No murmurs, rubs, or gallops  ABDOMEN: Soft, Nontender, Nondistended; Bowel sounds present  EXTREMITIES:  2+ Peripheral Pulses, No clubbing, cyanosis, or edema  LYMPH: No lymphadenopathy noted  SKIN: No rashes or lesions    Care Discussed with Consultants/Other Providers [ ] YES  [ ] NO

## 2018-04-04 NOTE — PROGRESS NOTE ADULT - ASSESSMENT
52 y/o male with a hx of HOCM, CHF (EF 53%) s/p AICD, HTN, HLD, DM-II (diet controlled), hx CVA, CAD s/p CABG 1994, Asthma, Gout, chronic LE pain, Afib on Eliquis with c/o generalized weakness, intermittent episodes of chest tightness (non-radiating), poor appetite, and dizziness on standing with near syncope x 2 days with associated nausea.     Problem/Plan - 1:  ·  Problem: Near syncope.  Plan: telemonitor  TTE reviewed  cards fu    Problem/Plan - 2:  ·  Problem: Hypokalemia.  Plan: monitor bmp    Problem/Plan - 3:  ·  Problem: Hypertrophic obstructive cardiomyopathy.  Plan: Monitor I's and O's  Fluid restriction  cards fu     Problem/Plan - 4:  ·  Problem: FANY (acute kidney injury).  Plan:monitor cr

## 2018-04-05 VITALS
RESPIRATION RATE: 18 BRPM | DIASTOLIC BLOOD PRESSURE: 58 MMHG | HEART RATE: 51 BPM | SYSTOLIC BLOOD PRESSURE: 95 MMHG | OXYGEN SATURATION: 98 % | TEMPERATURE: 98 F

## 2018-04-05 LAB
BUN SERPL-MCNC: 42 MG/DL — HIGH (ref 7–23)
BUN SERPL-MCNC: 42 MG/DL — HIGH (ref 7–23)
CALCIUM SERPL-MCNC: 9.2 MG/DL — SIGNIFICANT CHANGE UP (ref 8.4–10.5)
CALCIUM SERPL-MCNC: 9.2 MG/DL — SIGNIFICANT CHANGE UP (ref 8.4–10.5)
CHLORIDE SERPL-SCNC: 97 MMOL/L — LOW (ref 98–107)
CHLORIDE SERPL-SCNC: 97 MMOL/L — LOW (ref 98–107)
CO2 SERPL-SCNC: 27 MMOL/L — SIGNIFICANT CHANGE UP (ref 22–31)
CO2 SERPL-SCNC: 27 MMOL/L — SIGNIFICANT CHANGE UP (ref 22–31)
CREAT SERPL-MCNC: 1.19 MG/DL — SIGNIFICANT CHANGE UP (ref 0.5–1.3)
CREAT SERPL-MCNC: 1.19 MG/DL — SIGNIFICANT CHANGE UP (ref 0.5–1.3)
GLUCOSE BLDC GLUCOMTR-MCNC: 116 MG/DL — HIGH (ref 70–99)
GLUCOSE BLDC GLUCOMTR-MCNC: 123 MG/DL — HIGH (ref 70–99)
GLUCOSE SERPL-MCNC: 87 MG/DL — SIGNIFICANT CHANGE UP (ref 70–99)
GLUCOSE SERPL-MCNC: 87 MG/DL — SIGNIFICANT CHANGE UP (ref 70–99)
HCT VFR BLD CALC: 40.2 % — SIGNIFICANT CHANGE UP (ref 39–50)
HGB BLD-MCNC: 12.1 G/DL — LOW (ref 13–17)
MAGNESIUM SERPL-MCNC: 2.4 MG/DL — SIGNIFICANT CHANGE UP (ref 1.6–2.6)
MCHC RBC-ENTMCNC: 29 PG — SIGNIFICANT CHANGE UP (ref 27–34)
MCHC RBC-ENTMCNC: 30.1 % — LOW (ref 32–36)
MCV RBC AUTO: 96.4 FL — SIGNIFICANT CHANGE UP (ref 80–100)
NRBC # FLD: 0 — SIGNIFICANT CHANGE UP
PLATELET # BLD AUTO: 116 K/UL — LOW (ref 150–400)
PMV BLD: 11.7 FL — SIGNIFICANT CHANGE UP (ref 7–13)
POTASSIUM SERPL-MCNC: 3.9 MMOL/L — SIGNIFICANT CHANGE UP (ref 3.5–5.3)
POTASSIUM SERPL-MCNC: 3.9 MMOL/L — SIGNIFICANT CHANGE UP (ref 3.5–5.3)
POTASSIUM SERPL-SCNC: 3.9 MMOL/L — SIGNIFICANT CHANGE UP (ref 3.5–5.3)
POTASSIUM SERPL-SCNC: 3.9 MMOL/L — SIGNIFICANT CHANGE UP (ref 3.5–5.3)
RBC # BLD: 4.17 M/UL — LOW (ref 4.2–5.8)
RBC # FLD: 16.6 % — HIGH (ref 10.3–14.5)
SODIUM SERPL-SCNC: 138 MMOL/L — SIGNIFICANT CHANGE UP (ref 135–145)
SODIUM SERPL-SCNC: 138 MMOL/L — SIGNIFICANT CHANGE UP (ref 135–145)
WBC # BLD: 6.74 K/UL — SIGNIFICANT CHANGE UP (ref 3.8–10.5)
WBC # FLD AUTO: 6.74 K/UL — SIGNIFICANT CHANGE UP (ref 3.8–10.5)

## 2018-04-05 RX ORDER — DIGOXIN 250 MCG
1 TABLET ORAL
Qty: 0 | Refills: 0 | COMMUNITY

## 2018-04-05 RX ORDER — FUROSEMIDE 40 MG
1 TABLET ORAL
Qty: 30 | Refills: 0 | OUTPATIENT
Start: 2018-04-05 | End: 2018-05-04

## 2018-04-05 RX ORDER — ATORVASTATIN CALCIUM 80 MG/1
1 TABLET, FILM COATED ORAL
Qty: 0 | Refills: 0 | COMMUNITY
Start: 2018-04-05

## 2018-04-05 RX ORDER — FUROSEMIDE 40 MG
80 TABLET ORAL
Qty: 0 | Refills: 0 | COMMUNITY

## 2018-04-05 RX ORDER — ATORVASTATIN CALCIUM 80 MG/1
0 TABLET, FILM COATED ORAL
Qty: 0 | Refills: 0 | COMMUNITY

## 2018-04-05 RX ORDER — ACETAMINOPHEN 500 MG
325 TABLET ORAL EVERY 6 HOURS
Qty: 0 | Refills: 0 | Status: DISCONTINUED | OUTPATIENT
Start: 2018-04-05 | End: 2018-04-05

## 2018-04-05 RX ADMIN — OXYCODONE AND ACETAMINOPHEN 2 TABLET(S): 5; 325 TABLET ORAL at 12:36

## 2018-04-05 RX ADMIN — OXYCODONE AND ACETAMINOPHEN 2 TABLET(S): 5; 325 TABLET ORAL at 10:27

## 2018-04-05 RX ADMIN — BUDESONIDE AND FORMOTEROL FUMARATE DIHYDRATE 2 PUFF(S): 160; 4.5 AEROSOL RESPIRATORY (INHALATION) at 12:21

## 2018-04-05 RX ADMIN — OXYCODONE AND ACETAMINOPHEN 2 TABLET(S): 5; 325 TABLET ORAL at 06:36

## 2018-04-05 RX ADMIN — OXYCODONE AND ACETAMINOPHEN 2 TABLET(S): 5; 325 TABLET ORAL at 00:35

## 2018-04-05 RX ADMIN — Medication 80 MILLIGRAM(S): at 06:36

## 2018-04-05 RX ADMIN — OXYCODONE AND ACETAMINOPHEN 2 TABLET(S): 5; 325 TABLET ORAL at 17:25

## 2018-04-05 RX ADMIN — OXYCODONE AND ACETAMINOPHEN 2 TABLET(S): 5; 325 TABLET ORAL at 16:43

## 2018-04-05 RX ADMIN — Medication 0.6 MILLIGRAM(S): at 12:21

## 2018-04-05 RX ADMIN — OXYCODONE AND ACETAMINOPHEN 2 TABLET(S): 5; 325 TABLET ORAL at 01:30

## 2018-04-05 RX ADMIN — APIXABAN 5 MILLIGRAM(S): 2.5 TABLET, FILM COATED ORAL at 06:36

## 2018-04-05 RX ADMIN — MONTELUKAST 10 MILLIGRAM(S): 4 TABLET, CHEWABLE ORAL at 12:21

## 2018-04-05 RX ADMIN — Medication 50 MILLIGRAM(S): at 06:36

## 2018-04-05 NOTE — PROGRESS NOTE ADULT - ASSESSMENT
Near Syncope  possible from hypovolemia   ICD interrogation appreciated   monitor on tele  stable     afib  HR controlled  off digoxin  a/c    FANY, hypokalemia  off metolazone   improving     chronic systolic chf  warm on exam  cont BB  cont lasix  off metolazone    severe MR  given afib and pulm HTN and mildly reduced ef , pt needs MVRe  pt to follow up with his own card for work up and surgical plan

## 2018-04-05 NOTE — PROGRESS NOTE ADULT - SUBJECTIVE AND OBJECTIVE BOX
Subjective: Patient seen and examined. No new events except as noted.     SUBJECTIVE/ROS:  no new events       MEDICATIONS:  MEDICATIONS  (STANDING):  apixaban 5 milliGRAM(s) Oral every 12 hours  atorvastatin 10 milliGRAM(s) Oral at bedtime  buDESOnide 160 MICROgram(s)/formoterol 4.5 MICROgram(s) Inhaler 2 Puff(s) Inhalation two times a day  colchicine 0.6 milliGRAM(s) Oral daily  dextrose 5%. 1000 milliLiter(s) (50 mL/Hr) IV Continuous <Continuous>  dextrose 50% Injectable 12.5 Gram(s) IV Push once  dextrose 50% Injectable 25 Gram(s) IV Push once  dextrose 50% Injectable 25 Gram(s) IV Push once  furosemide    Tablet 80 milliGRAM(s) Oral daily  insulin lispro (HumaLOG) corrective regimen sliding scale   SubCutaneous three times a day before meals  insulin lispro (HumaLOG) corrective regimen sliding scale   SubCutaneous at bedtime  metoprolol tartrate 50 milliGRAM(s) Oral every 12 hours  montelukast 10 milliGRAM(s) Oral daily      PHYSICAL EXAM:  T(C): 36.6 (04-05-18 @ 13:45), Max: 36.9 (04-04-18 @ 18:04)  HR: 51 (04-05-18 @ 13:45) (51 - 64)  BP: 95/58 (04-05-18 @ 13:45) (95/58 - 114/73)  RR: 18 (04-05-18 @ 13:45) (16 - 18)  SpO2: 98% (04-05-18 @ 13:45) (98% - 100%)  Wt(kg): --  I&O's Summary    04 Apr 2018 07:01  -  05 Apr 2018 07:00  --------------------------------------------------------  IN: 500 mL / OUT: 400 mL / NET: 100 mL        JVP: Normal  Neck: supple  Lung: clear   CV: S1 S2 , Murmur:  Abd: soft  Ext: No edema  neuro: Awake / alert  Psych: flat affect  Skin: normal       LABS/DATA:    CARDIAC MARKERS:                                12.1   6.74  )-----------( 116      ( 05 Apr 2018 06:18 )             40.2     04-05    138  |  97<L>  |  42<H>  ----------------------------<  87  3.9   |  27  |  1.19    Ca    9.2      05 Apr 2018 06:18  Mg     2.4     04-05      proBNP:   Lipid Profile:   HgA1c:   TSH:     TELE:  EKG:

## 2018-04-05 NOTE — PROGRESS NOTE ADULT - SUBJECTIVE AND OBJECTIVE BOX
No pain, no shortness of breath      VITAL:  T(C): , Max: 36.9 (04-04-18 @ 18:04)  T(F): , Max: 98.4 (04-04-18 @ 18:04)  HR: 51 (04-05-18 @ 13:45)  BP: 95/58 (04-05-18 @ 13:45)  BP(mean): --  RR: 18 (04-05-18 @ 13:45)  SpO2: 98% (04-05-18 @ 13:45)      PHYSICAL EXAM:  Constitutional: NAD, Alert; short  HEENT: NCAT, DMM  Neck: Supple, No JVD  Respiratory: CTA-b/l  Chest: (+)left upper chest ICD - incision clean-appearing  Cardiovascular: RRR s1s2, 2/6 MG  Gastrointestinal: BS+, soft, NT/ND  Extremities: 1+ swelling b/l LE edema  Neurological: no focal deficits; strength grossly intact  Psychiatric: Normal mood, normal affect  Back: no CVAT b/l  Skin: (+)midsternal wound - clean-appearing; diffuse papular rash      LABS:                        12.1   6.74  )-----------( 116      ( 05 Apr 2018 06:18 )             40.2     Na(138)/K(3.9)/Cl(97)/HCO3(27)/BUN(42)/Cr(1.19)Glu(87)/Ca(9.2)/Mg(2.4)/PO4(--)    04-05 @ 06:18  Na(136)/K(3.5)/Cl(96)/HCO3(30)/BUN(47)/Cr(1.09)Glu(86)/Ca(9.2)/Mg(--)/PO4(--)    04-04 @ 05:10  Na(138)/K(3.7)/Cl(98)/HCO3(31)/BUN(55)/Cr(1.36)Glu(120)/Ca(9.2)/Mg(2.1)/PO4(--)    04-03 @ 06:30      Protein, Random Urine: > 200 mg/dL (04-04 @ 21:56)  (244.2mg/dL)      ASSESSMENT: 53M w/ HTN, DM2, HOCM, CAD-CABG, Asthma, Gout, and AFib, 4/1/18 a/w CP/nausea/dizziness for 2 days    (1)Renal - CKD2-3 - near-nephrotic range proteinuria - highly likely diabetic nephropathy. Resolved superimposed FANY from prerenal azotemia.    (2)CV - dizziness on admission - nonorthostatic. Severe MR.       RECOMMEND:  (1)Lasix 80mg po daily as ordered  (2)      Gigi Huertas MD  [facility]  [phone]

## 2022-05-26 NOTE — H&P ADULT - PSH
Acquired cardiac septal defect    Cardiac pacemaker  1994, [employer]  H/O bilateral hip replacements  2013  History of open heart surgery  1994- CABG  S/P hip replacement Xeljanz Counseling: I discussed with the patient the risks of Xeljanz therapy including increased risk of infection, liver issues, headache, diarrhea, or cold symptoms. Live vaccines should be avoided. They were instructed to call if they have any problems.

## 2022-08-26 NOTE — PROGRESS NOTE ADULT - ASSESSMENT
54 y/o male with a hx of HOCM, CHF (EF 53%) s/p AICD, HTN, HLD, DM-II (diet controlled), hx CVA, CAD s/p CABG 1994, Asthma, Gout, chronic LE pain, Afib on Eliquis with c/o generalized weakness, intermittent episodes of chest tightness (non-radiating), poor appetite, and dizziness on standing with near syncope x 2 days with associated nausea.     Problem/Plan - 1:  ·  Problem: Near syncope.  Plan: telemonitor  Obtain TTE  EP c/s for device interrogation.cards fu    Problem/Plan - 2:  ·  Problem: Hypokalemia.  Plan: monitor bmp    Problem/Plan - 3:  ·  Problem: Hypertrophic obstructive cardiomyopathy.  Plan: Obtain TTE  Monitor I's and O's  1.5L Fluid restriction  cards fu     Problem/Plan - 4:  ·  Problem: FANY (acute kidney injury).  Plan:monitor cr  renal fu operating room

## 2023-10-31 NOTE — DISCHARGE NOTE ADULT - PLAN OF CARE
no accessory muscle use, equal chest expansion, no cough follow up with your outpatient Cardiologist Dr. Cox for further workup follow up with Dr. Huertas in 2 weeks to monitor your kidney function, your lasix dose was changed to 80mg daily and your metolazone was discontinued continue your Metoprolol and your digoxin was discontinued, continue eliquis for anticoagulation prevent further episode likely from dehydration, your diuretics were changed and you were given intravenous fluids